# Patient Record
Sex: MALE | Race: BLACK OR AFRICAN AMERICAN | Employment: PART TIME | URBAN - METROPOLITAN AREA
[De-identification: names, ages, dates, MRNs, and addresses within clinical notes are randomized per-mention and may not be internally consistent; named-entity substitution may affect disease eponyms.]

---

## 2018-08-26 ENCOUNTER — HOSPITAL ENCOUNTER (INPATIENT)
Age: 18
LOS: 2 days | Discharge: HOME | DRG: 885 | End: 2018-08-29
Attending: EMERGENCY MEDICINE | Admitting: PSYCHIATRY & NEUROLOGY
Payer: COMMERCIAL

## 2018-08-26 DIAGNOSIS — R45.851 SUICIDAL IDEATIONS: Primary | ICD-10-CM

## 2018-08-26 PROCEDURE — 99284 EMERGENCY DEPT VISIT MOD MDM: CPT

## 2018-08-26 NOTE — IP AVS SNAPSHOT
[address] Patient: Heide Sagastume MRN: [id number] BSI:4/78/2622 A check carolyn indicates which time of day the medication should be taken. My Medications Notice You have not been prescribed any medications.

## 2018-08-26 NOTE — IP AVS SNAPSHOT
Summary of Care Report The Summary of Care report has been created to help improve care coordination. Users with access to Playblazer or [address] (Web-based application) may access additional patient information including the Discharge Summary. If you are not currently a [address] user and need more information, please call the number listed below in the [place] [address] section and ask to be connected with Medical Records. Facility Information Name Address Phone [address] [place] [address] 3630 [facility] [address] [phone] Patient Information Patient Name Sex  [address], [address] ([id number]) Male 2000 Discharge Information Admitting Provider Service Area Unit Tracey Cook MD  [place] [address] / [phone] Discharge Provider Discharge Date/Time Discharge Disposition Destination (none) 2018 Afternoon (Pending) AHR (none) Patient Language Language ENGLISH [13] Hospital Problems as of 2018  Never Reviewed Class Noted - Resolved Last Modified POA Active Problems Suicidal behavior  2018 - Present 2018 by Tracey Cook MD Unknown Entered by Tracey Cook MD  
  Depression  2018 - Present 2018 by Tracey Cook MD Unknown Entered by Tracey Cook MD  
  Suicidal thoughts  2018 - Present 2018 by Tracey Cook MD Unknown Entered by Tracey Cook MD  
  
You are allergic to the following No active allergies Current Discharge Medication List  
  
Notice You have not been prescribed any medications. Follow-up Information Follow up With Details Comments Contact Info  [employer]  Patient will follow up with outpatient care with [facility] with walk in intake hours: Monday through Thursday from 8:15am to 11:15am and 2:15pm to 4:15pm.  [facility] [address] [facility] [address] Phone: [phone] None   None (395) Patient stated that they have no PCP Discharge Instructions BEHAVIORAL HEALTH NURSING DISCHARGE NOTE Emergency Numbers [facility] Desk: [phone] [place] [facility]: [phone] Suicide Prevention Line: [phone] (TALK) The following personal items collected during your admission are returned to you:  
Dental Appliance: Dental Appliances: None Vision: Visual Aid: None Hearing Aid:   
Jewelry: Jewelry: Earrings, Other (comment) (sudarshan ear ring pt kept nose ring) Clothing: Clothing: Footwear, Jacket/Coat, Pants Other Valuables: Other Valuables: Cell Phone Valuables sent to safe: Personal Items Sent to Safe: none The discharge information has been reviewed with the patient. The patient verbalized understanding. Ph.Creative Activation Thank you for requesting access to Ph.Creative. Please follow the instructions below to securely access and download your online medical record. Ph.Creative allows you to send messages to your doctor, view your test results, renew your prescriptions, schedule appointments, and more. How Do I Sign Up? 1. In your internet browser, go to [email or web address] 
2. Click on the First Time User? Click Here link in the Sign In box. You will be redirect to the New Member Sign Up page. 3. Enter your Ph.Creative Access Code exactly as it appears below. You will not need to use this code after youve completed the sign-up process. If you do not sign up before the expiration date, you must request a new code. Ph.Creative Access Code: [email or web address] Expires: 2018 12:33 AM (This is the date your Ph.Creative access code will ) 4. Enter the last four digits of your Social Security Number (xxxx) and Date of Birth (mm/dd/yyyy) as indicated and click Submit. You will be taken to the next sign-up page. 5. Create a Ag[employer] Networks ID. This will be your [employer] login ID and cannot be changed, so think of one that is secure and easy to remember. 6. Create a [employer] password. You can change your password at any time. 7. Enter your Password Reset Question and Answer. This can be used at a later time if you forget your password. 8. Enter your e-mail address. You will receive e-mail notification when new information is available in [address] 9. Click Sign Up. You can now view and download portions of your medical record. 10. Click the Download Summary menu link to download a portable copy of your medical information. Additional Information If you have questions, please visit the Frequently Asked Questions section of the [employer] website at [email or web address] Remember, [employer] is NOT to be used for urgent needs. For medical emergencies, dial 911. Patient armband removed and shredded Chart Review Routing History No Routing History on File

## 2018-08-26 NOTE — IP AVS SNAPSHOT
303 [facility] 
 
 
 [address] Patient: Heide Sagastume MRN: [id number] QIS:7/29/2995 About your hospitalization You were admitted on:  August 27, 2018 You last received care in the:  [facility] SPECIAL TRT 1 You were discharged on:  August 29, 2018 Why you were hospitalized Your primary diagnosis was:  Not on File Your diagnoses also included:  Suicidal Behavior, Depression, Suicidal Thoughts Follow-up Information Follow up With Details Comments Contact Info [facility]  Patient will follow up with outpatient care with [facility] with walk in intake hours: Monday through Thursday from 8:15am to 11:15am and 2:15pm to 4:15pm.  [facility] [place] [address] Phone: [phone] None   None (395) Patient stated that they have no PCP Discharge Orders None A check carolyn indicates which time of day the medication should be taken. My Medications Notice You have not been prescribed any medications. Discharge Instructions BEHAVIORAL HEALTH NURSING DISCHARGE NOTE Emergency Numbers 7354 [facility] Desk: [phone] [place] [facility]: [phone] Suicide Prevention Line: [phone] (TALK) The following personal items collected during your admission are returned to you:  
Dental Appliance: Dental Appliances: None Vision: Visual Aid: None Hearing Aid:   
Jewelry: Jewelry: Earrings, Other (comment) (sudarshan ear ring pt kept nose ring) Clothing: Clothing: Footwear, Jacket/Coat, Pants Other Valuables: Other Valuables: Cell Phone Valuables sent to safe: Personal Items Sent to Safe: none The discharge information has been reviewed with the patient. The patient verbalized understanding. MyChart Activation Thank you for requesting access to Medsphere Systems. Please follow the instructions below to securely access and download your online medical record. Medsphere Systems allows you to send messages to your doctor, view your test results, renew your prescriptions, schedule appointments, and more. How Do I Sign Up? 1. In your internet browser, go to [email or web address] 
2. Click on the First Time User? Click Here link in the Sign In box. You will be redirect to the New Member Sign Up page. 3. Enter your Med[employer] Systems Access Code exactly as it appears below. You will not need to use this code after youve completed the sign-up process. If you do not sign up before the expiration date, you must request a new code. Medsphere Systems Access Code: [email or web address] Expires: 2018 12:33 AM (This is the date your [employer] Systems access code will ) 4. Enter the last four digits of your Social Security Number (xxxx) and Date of Birth (mm/dd/yyyy) as indicated and click Submit. You will be taken to the next sign-up page. 5. Create a Medsphere Systems ID. This will be your [employer]sphere Systems login ID and cannot be changed, so think of one that is secure and easy to remember. 6. Create a [employer] Systems password. You can change your password at any time. 7. Enter your Password Reset Question and Answer. This can be used at a later time if you forget your password. 8. Enter your e-mail address. You will receive e-mail notification when new information is available in [address] 9. Click Sign Up. You can now view and download portions of your medical record. 10. Click the Download Summary menu link to download a portable copy of your medical information. Additional Information If you have questions, please visit the Frequently Asked Questions section of the Medsphere Systems website at [email or web address] Remember, Medsphere Systems is NOT to be used for urgent needs. For medical emergencies, dial 911. Patient armband removed and shredded Introducing [facility] & HEALTH SERVICES! [employer] introduces PrecisionHawkt patient portal. Now you can access parts of your medical record, email your doctor's office, and request medication refills online. 1. In your internet browser, go to [email or web address] 2. Click on the First Time User? Click Here link in the Sign In box. You will see the New Member Sign Up page. 3. Enter your [employer]mex [employer] International Access Code exactly as it appears below. You will not need to use this code after youve completed the sign-up process. If you do not sign up before the expiration date, you must request a new code. · [employer] Access Code: [email or web address] Expires: 11/25/2018 12:33 AM 
 
4. Enter the last four digits of your Social Security Number (xxxx) and Date of Birth (mm/dd/yyyy) as indicated and click Submit. You will be taken to the next sign-up page. 5. Create a [employer] International ID. This will be your [employer] login ID and cannot be changed, so think of one that is secure and easy to remember. 6. Create a [employer] password. You can change your password at any time. 7. Enter your Password Reset Question and Answer. This can be used at a later time if you forget your password. 8. Enter your e-mail address. You will receive e-mail notification when new information is available in [address] 9. Click Sign Up. You can now view and download portions of your medical record. 10. Click the Download Summary menu link to download a portable copy of your medical information. If you have questions, please visit the Frequently Asked Questions section of the [employer] website. Remember, [employer] is NOT to be used for urgent needs. For medical emergencies, dial 911. Now available from your iPhone and Android! Introducing Gordy Palencia As a [employer] patient, I wanted to make you aware of our electronic visit tool called Gordy Palencia. [employer] 24/7 allows you to connect within minutes with a medical provider 24 hours a day, seven days a week via a mobile device or tablet or logging into a secure website from your computer. You can access Supersonic from anywhere in the [place]. A virtual visit might be right for you when you have a simple condition and feel like you just dont want to get out of bed, or cant get away from work for an appointment, when your regular [address] [place] [address] provider is not available (evenings, weekends or holidays), or when youre out of town and need minor care. Electronic visits cost only $49 and if the [address] [place] [address] 24/7 provider determines a prescription is needed to treat your condition, one can be electronically transmitted to a nearby pharmacy*. Please take a moment to enroll today if you have not already done so. The enrollment process is free and takes just a few minutes. To enroll, please download the Science Behind Sweat [facility] 24/7 ad to your tablet or phone, or visit [email or web address] to enroll on your computer. And, as an [address] [place] [address] patient with a NantWorks account, the results of your visits will be scanned into your electronic medical record and your primary care provider will be able to view the scanned results. We urge you to continue to see your regular [address] [place] [address] provider for your ongoing medical care. And while your primary care provider may not be the one available when you seek a Gordy Crawfordmoshefin virtual visit, the peace of mind you get from getting a real diagnosis real time can be priceless. For more information on [employer] Mediamoshefin, view our Frequently Asked Questions (FAQs) at [email or web address]. Sincerely, 
 
Shima Vogt MD 
Chief Medical Officer Karen Longo *:  certain medications cannot be prescribed via Advision Mediarody Providers Seen During Your Hospitalization Provider Specialty Primary office phone Shanda Cunningham MD Emergency Medicine [phone] Vikram Desai MD Psychiatry [phone] Your Primary Care Physician (PCP) Primary Care Physician Office Phone Office Fax NONE ** None ** ** None ** You are allergic to the following No active allergies Recent Documentation Height Weight BMI Smoking Status 1.854 m (89 %, Z= 1.21)* 61.2 kg (17 %, Z= -0.94)* 17.81 kg/m2 (<1 %, Z= -2.42)* Never Smoker *Growth percentiles are based on CDC 2-20 Years data. Emergency Contacts Name Discharge Info Relation Home Work Mobile Cindy Schwartz DISCHARGE CAREGIVER [3] Girlfriend [18] [phone] Patient Belongings The following personal items are in your possession at time of discharge: 
  Dental Appliances: None  Visual Aid: None      Home Medications: None   Jewelry: Earrings, Other (comment) (sudarshan ear ring pt kept nose ring)  Clothing: Footwear, Jacket/Coat, Pants    Other Valuables: Cell Phone  Personal Items Sent to Safe: none Please provide this summary of care documentation to your next provider. Signatures-by signing, you are acknowledging that this After Visit Summary has been reviewed with you and you have received a copy. Patient Signature:  ____________________________________________________________ Date:  ____________________________________________________________  
  
[facility] Provider Signature:  ____________________________________________________________ Date:  ____________________________________________________________

## 2018-08-27 PROBLEM — F32.A DEPRESSION: Status: ACTIVE | Noted: 2018-08-27

## 2018-08-27 PROBLEM — R45.89 SUICIDAL BEHAVIOR: Status: ACTIVE | Noted: 2018-08-27

## 2018-08-27 PROBLEM — R45.851 SUICIDAL THOUGHTS: Status: ACTIVE | Noted: 2018-08-27

## 2018-08-27 LAB
ALBUMIN SERPL-MCNC: 4.3 G/DL (ref 3.4–5)
ALBUMIN/GLOB SERPL: 1.1 {RATIO} (ref 0.8–1.7)
ALP SERPL-CCNC: 73 U/L (ref 45–117)
ALT SERPL-CCNC: 35 U/L (ref 16–61)
AMPHET UR QL SCN: NEGATIVE
ANION GAP SERPL CALC-SCNC: 8 MMOL/L (ref 3–18)
APPEARANCE UR: CLEAR
AST SERPL-CCNC: 26 U/L (ref 15–37)
BACTERIA URNS QL MICRO: NEGATIVE /HPF
BARBITURATES UR QL SCN: NEGATIVE
BASOPHILS # BLD: 0 K/UL (ref 0–0.1)
BASOPHILS NFR BLD: 0 % (ref 0–2)
BENZODIAZ UR QL: NEGATIVE
BILIRUB SERPL-MCNC: 0.7 MG/DL (ref 0.2–1)
BILIRUB UR QL: ABNORMAL
BUN SERPL-MCNC: 17 MG/DL (ref 7–18)
BUN/CREAT SERPL: 22 (ref 12–20)
CALCIUM SERPL-MCNC: 9.3 MG/DL (ref 8.5–10.1)
CANNABINOIDS UR QL SCN: POSITIVE
CHLORIDE SERPL-SCNC: 105 MMOL/L (ref 100–108)
CO2 SERPL-SCNC: 26 MMOL/L (ref 21–32)
COCAINE UR QL SCN: NEGATIVE
COLOR UR: ABNORMAL
CREAT SERPL-MCNC: 0.76 MG/DL (ref 0.6–1.3)
DIFFERENTIAL METHOD BLD: ABNORMAL
EOSINOPHIL # BLD: 0.2 K/UL (ref 0–0.4)
EOSINOPHIL NFR BLD: 3 % (ref 0–5)
EPITH CASTS URNS QL MICRO: ABNORMAL /LPF (ref 0–5)
ERYTHROCYTE [DISTWIDTH] IN BLOOD BY AUTOMATED COUNT: 12.5 % (ref 11.6–14.5)
ETHANOL SERPL-MCNC: <3 MG/DL (ref 0–3)
GLOBULIN SER CALC-MCNC: 3.9 G/DL (ref 2–4)
GLUCOSE SERPL-MCNC: 98 MG/DL (ref 74–99)
GLUCOSE UR STRIP.AUTO-MCNC: NEGATIVE MG/DL
HCT VFR BLD AUTO: 36.8 % (ref 36–48)
HDSCOM,HDSCOM: ABNORMAL
HGB BLD-MCNC: 12.7 G/DL (ref 13–16)
HGB UR QL STRIP: NEGATIVE
KETONES UR QL STRIP.AUTO: 40 MG/DL
LEUKOCYTE ESTERASE UR QL STRIP.AUTO: ABNORMAL
LYMPHOCYTES # BLD: 1.8 K/UL (ref 0.9–3.6)
LYMPHOCYTES NFR BLD: 34 % (ref 21–52)
MCH RBC QN AUTO: 28.3 PG (ref 24–34)
MCHC RBC AUTO-ENTMCNC: 34.5 G/DL (ref 31–37)
MCV RBC AUTO: 82 FL (ref 74–97)
METHADONE UR QL: NEGATIVE
MONOCYTES # BLD: 0.5 K/UL (ref 0.05–1.2)
MONOCYTES NFR BLD: 9 % (ref 3–10)
MUCOUS THREADS URNS QL MICRO: ABNORMAL /LPF
NEUTS SEG # BLD: 3 K/UL (ref 1.8–8)
NEUTS SEG NFR BLD: 54 % (ref 40–73)
NITRITE UR QL STRIP.AUTO: NEGATIVE
OPIATES UR QL: NEGATIVE
OTHER,OTHU: ABNORMAL
PCP UR QL: NEGATIVE
PH UR STRIP: 6 [PH] (ref 5–8)
PLATELET # BLD AUTO: 214 K/UL (ref 135–420)
PMV BLD AUTO: 9.5 FL (ref 9.2–11.8)
POTASSIUM SERPL-SCNC: 3.3 MMOL/L (ref 3.5–5.5)
PROT SERPL-MCNC: 8.2 G/DL (ref 6.4–8.2)
PROT UR STRIP-MCNC: 100 MG/DL
RBC # BLD AUTO: 4.49 M/UL (ref 4.7–5.5)
RBC #/AREA URNS HPF: ABNORMAL /HPF (ref 0–5)
SODIUM SERPL-SCNC: 139 MMOL/L (ref 136–145)
SP GR UR REFRACTOMETRY: >1.03 (ref 1–1.03)
TSH SERPL DL<=0.05 MIU/L-ACNC: 0.87 UIU/ML (ref 0.36–3.74)
UROBILINOGEN UR QL STRIP.AUTO: 1 EU/DL (ref 0.2–1)
WBC # BLD AUTO: 5.5 K/UL (ref 4.6–13.2)
WBC URNS QL MICRO: ABNORMAL /HPF (ref 0–4)

## 2018-08-27 PROCEDURE — 84443 ASSAY THYROID STIM HORMONE: CPT | Performed by: PSYCHIATRY & NEUROLOGY

## 2018-08-27 PROCEDURE — 85025 COMPLETE CBC W/AUTO DIFF WBC: CPT | Performed by: NURSE PRACTITIONER

## 2018-08-27 PROCEDURE — 81001 URINALYSIS AUTO W/SCOPE: CPT | Performed by: NURSE PRACTITIONER

## 2018-08-27 PROCEDURE — 36415 COLL VENOUS BLD VENIPUNCTURE: CPT | Performed by: PSYCHIATRY & NEUROLOGY

## 2018-08-27 PROCEDURE — 80307 DRUG TEST PRSMV CHEM ANLYZR: CPT | Performed by: NURSE PRACTITIONER

## 2018-08-27 PROCEDURE — 65220000005 HC RM SEMIPRIVATE PSYCH 3 OR 4 BED

## 2018-08-27 PROCEDURE — 80053 COMPREHEN METABOLIC PANEL: CPT | Performed by: NURSE PRACTITIONER

## 2018-08-27 RX ORDER — LORAZEPAM 2 MG/ML
1 INJECTION INTRAMUSCULAR
Status: DISCONTINUED | OUTPATIENT
Start: 2018-08-27 | End: 2018-08-29 | Stop reason: HOSPADM

## 2018-08-27 RX ORDER — IBUPROFEN 400 MG/1
400 TABLET ORAL
Status: DISCONTINUED | OUTPATIENT
Start: 2018-08-27 | End: 2018-08-29 | Stop reason: HOSPADM

## 2018-08-27 RX ORDER — HYDROXYZINE PAMOATE 50 MG/1
50 CAPSULE ORAL
Status: DISCONTINUED | OUTPATIENT
Start: 2018-08-27 | End: 2018-08-29 | Stop reason: HOSPADM

## 2018-08-27 RX ORDER — LORAZEPAM 1 MG/1
2 TABLET ORAL
Status: DISCONTINUED | OUTPATIENT
Start: 2018-08-27 | End: 2018-08-29 | Stop reason: HOSPADM

## 2018-08-27 RX ORDER — BENZTROPINE MESYLATE 1 MG/1
1 TABLET ORAL
Status: DISCONTINUED | OUTPATIENT
Start: 2018-08-27 | End: 2018-08-29 | Stop reason: HOSPADM

## 2018-08-27 RX ORDER — HALOPERIDOL 5 MG/1
5 TABLET ORAL
Status: DISCONTINUED | OUTPATIENT
Start: 2018-08-27 | End: 2018-08-29 | Stop reason: HOSPADM

## 2018-08-27 RX ORDER — BENZTROPINE MESYLATE 1 MG/ML
1 INJECTION INTRAMUSCULAR; INTRAVENOUS
Status: DISCONTINUED | OUTPATIENT
Start: 2018-08-27 | End: 2018-08-29 | Stop reason: HOSPADM

## 2018-08-27 RX ORDER — BENZTROPINE MESYLATE 1 MG/ML
2 INJECTION INTRAMUSCULAR; INTRAVENOUS
Status: DISCONTINUED | OUTPATIENT
Start: 2018-08-27 | End: 2018-08-29 | Stop reason: HOSPADM

## 2018-08-27 RX ORDER — LORAZEPAM 1 MG/1
1 TABLET ORAL
Status: DISCONTINUED | OUTPATIENT
Start: 2018-08-27 | End: 2018-08-29 | Stop reason: HOSPADM

## 2018-08-27 RX ORDER — TRAZODONE HYDROCHLORIDE 50 MG/1
50 TABLET ORAL
Status: DISCONTINUED | OUTPATIENT
Start: 2018-08-27 | End: 2018-08-29 | Stop reason: HOSPADM

## 2018-08-27 RX ORDER — LORAZEPAM 2 MG/ML
2 INJECTION INTRAMUSCULAR
Status: DISCONTINUED | OUTPATIENT
Start: 2018-08-27 | End: 2018-08-29 | Stop reason: HOSPADM

## 2018-08-27 RX ORDER — BENZTROPINE MESYLATE 1 MG/1
2 TABLET ORAL
Status: DISCONTINUED | OUTPATIENT
Start: 2018-08-27 | End: 2018-08-29 | Stop reason: HOSPADM

## 2018-08-27 RX ORDER — HALOPERIDOL 5 MG/ML
5 INJECTION INTRAMUSCULAR
Status: DISCONTINUED | OUTPATIENT
Start: 2018-08-27 | End: 2018-08-29 | Stop reason: HOSPADM

## 2018-08-27 NOTE — ED PROVIDER NOTES
HPI Comments: Pt presents to ed with police in cuffs after being on a bridge and threatening to jump. Pt states suicidal thoughts but does not know now if he still has them, no injuries reported. Pt does not have a hx of mental health problems    Patient is a 21 y.o. male presenting with mental health disorder. The history is provided by the patient and the police. No  was used. Mental Health Problem   The primary symptoms include bizarre behavior. The current episode started today. The bizarre behavior started this week. He has abnormal agitated behavior. The degree of incapacity that he is experiencing as a consequence of his illness is moderate. He admits to suicidal ideas. He does have a plan to commit suicide. He contemplates harming himself. He has not already injured self. He does not contemplate injuring another person. He has not already  injured another person. No past medical history on file. No past surgical history on file. No family history on file. Social History     Social History    Marital status: SINGLE     Spouse name: N/A    Number of children: N/A    Years of education: N/A     Occupational History    Not on file. Social History Main Topics    Smoking status: Not on file    Smokeless tobacco: Not on file    Alcohol use Not on file    Drug use: Not on file    Sexual activity: Not on file     Other Topics Concern    Not on file     Social History Narrative         ALLERGIES: Review of patient's allergies indicates no known allergies. Review of Systems   Constitutional: Negative for fever. Respiratory: Negative for chest tightness. Neurological: Negative for dizziness and weakness. Psychiatric/Behavioral: Positive for suicidal ideas. Negative for self-injury. All other systems reviewed and are negative.       Vitals:    08/27/18 0039   BP: 127/74   Pulse: 79   Resp: 16   Temp: 97.8 °F (36.6 °C)   SpO2: 100%            Physical Exam   Constitutional: He is oriented to person, place, and time. He appears well-developed and well-nourished. HENT:   Head: Normocephalic and atraumatic. Eyes: Conjunctivae and EOM are normal. Pupils are equal, round, and reactive to light. Neck: Normal range of motion. Neck supple. Cardiovascular: Normal rate and regular rhythm. Pulmonary/Chest: Effort normal and breath sounds normal.   Abdominal: Soft. Bowel sounds are normal.   Musculoskeletal: Normal range of motion. Neurological: He is alert and oriented to person, place, and time. He has normal reflexes. Skin: Skin is warm and dry. Psychiatric: His speech is normal. Judgment normal. He is withdrawn. He exhibits a depressed mood. He expresses suicidal ideation. He expresses no homicidal ideation. He expresses suicidal plans. Nursing note and vitals reviewed.        MDM  Number of Diagnoses or Management Options  Suicidal ideations:   Diagnosis management comments: CSB in room with pt for evaluation    0100 Ramses from [facility] informed me pt would be on TDO    0200  Per Jonathan Gonzalez and Vandana Yan with  pt to be admitted to out inpatient mental health unit       Amount and/or Complexity of Data Reviewed  Clinical lab tests: ordered and reviewed  Review and summarize past medical records: yes  Independent visualization of images, tracings, or specimens: yes    Risk of Complications, Morbidity, and/or Mortality  Presenting problems: moderate  Diagnostic procedures: moderate  Management options: moderate          ED Course       Procedures            Vitals:  Patient Vitals for the past 12 hrs:   Temp Pulse Resp BP SpO2   08/27/18 0039 97.8 °F (36.6 °C) 79 16 127/74 100 %       Medications ordered:   Medications - No data to display      Lab findings:  Recent Results (from the past 12 hour(s))   CBC WITH AUTOMATED DIFF    Collection Time: 08/27/18  1:20 AM   Result Value Ref Range    WBC 5.5 4.6 - 13.2 K/uL    RBC 4.49 (L) 4.70 - 5.50 M/uL    HGB 12.7 (L) 13.0 - 16.0 g/dL    HCT 36.8 36.0 - 48.0 %    MCV 82.0 74.0 - 97.0 FL    MCH 28.3 24.0 - 34.0 PG    MCHC 34.5 31.0 - 37.0 g/dL    RDW 12.5 11.6 - 14.5 %    PLATELET 394 558 - 848 K/uL    MPV 9.5 9.2 - 11.8 FL    NEUTROPHILS 54 40 - 73 %    LYMPHOCYTES 34 21 - 52 %    MONOCYTES 9 3 - 10 %    EOSINOPHILS 3 0 - 5 %    BASOPHILS 0 0 - 2 %    ABS. NEUTROPHILS 3.0 1.8 - 8.0 K/UL    ABS. LYMPHOCYTES 1.8 0.9 - 3.6 K/UL    ABS. MONOCYTES 0.5 0.05 - 1.2 K/UL    ABS. EOSINOPHILS 0.2 0.0 - 0.4 K/UL    ABS. BASOPHILS 0.0 0.0 - 0.1 K/UL    DF AUTOMATED     METABOLIC PANEL, COMPREHENSIVE    Collection Time: 08/27/18  1:20 AM   Result Value Ref Range    Sodium 139 136 - 145 mmol/L    Potassium 3.3 (L) 3.5 - 5.5 mmol/L    Chloride 105 100 - 108 mmol/L    CO2 26 21 - 32 mmol/L    Anion gap 8 3.0 - 18 mmol/L    Glucose 98 74 - 99 mg/dL    BUN 17 7.0 - 18 MG/DL    Creatinine 0.76 0.6 - 1.3 MG/DL    BUN/Creatinine ratio 22 (H) 12 - 20      GFR est AA >60 >60 ml/min/1.73m2    GFR est non-AA >60 >60 ml/min/1.73m2    Calcium 9.3 8.5 - 10.1 MG/DL    Bilirubin, total 0.7 0.2 - 1.0 MG/DL    ALT (SGPT) 35 16 - 61 U/L    AST (SGOT) 26 15 - 37 U/L    Alk.  phosphatase 73 45 - 117 U/L    Protein, total 8.2 6.4 - 8.2 g/dL    Albumin 4.3 3.4 - 5.0 g/dL    Globulin 3.9 2.0 - 4.0 g/dL    A-G Ratio 1.1 0.8 - 1.7     ETHYL ALCOHOL    Collection Time: 08/27/18  1:20 AM   Result Value Ref Range    ALCOHOL(ETHYL),SERUM <3 0 - 3 MG/DL   URINALYSIS W/ RFLX MICROSCOPIC    Collection Time: 08/27/18  1:25 AM   Result Value Ref Range    Color DARK YELLOW      Appearance CLEAR      Specific gravity >1.030 (H) 1.005 - 1.030    pH (UA) 6.0 5.0 - 8.0      Protein 100 (A) NEG mg/dL    Glucose NEGATIVE  NEG mg/dL    Ketone 40 (A) NEG mg/dL    Bilirubin SMALL (A) NEG      Blood NEGATIVE  NEG      Urobilinogen 1.0 0.2 - 1.0 EU/dL    Nitrites NEGATIVE  NEG      Leukocyte Esterase TRACE (A) NEG     DRUG SCREEN, URINE    Collection Time: 08/27/18  1:25 AM   Result Value Ref Range    BENZODIAZEPINES NEGATIVE  NEG      BARBITURATES NEGATIVE  NEG      THC (TH-CANNABINOL) POSITIVE (A) NEG      OPIATES NEGATIVE  NEG      PCP(PHENCYCLIDINE) NEGATIVE  NEG      COCAINE NEGATIVE  NEG      AMPHETAMINES NEGATIVE  NEG      METHADONE NEGATIVE  NEG      HDSCOM (NOTE)    URINE MICROSCOPIC ONLY    Collection Time: 08/27/18  1:25 AM   Result Value Ref Range    WBC 4 to 10 0 - 4 /hpf    RBC NONE 0 - 5 /hpf    Epithelial cells FEW 0 - 5 /lpf    Bacteria NEGATIVE  NEG /hpf    Mucus 4+ (A) NEG /lpf    Other: RENAL TUBULAR EPITHELIAL CELLS               Disposition:    Diagnosis:   1.  Suicidal ideations        Disposition:  Admitted to inpatient mental health unit         Savanna Katz ENP-C,FNP-C

## 2018-08-27 NOTE — ED NOTES
Report called to Aleisha Collins. Pt to be transferred via wheelchair with officer and security and ED staff. Belongings with pt. Pt in blue scrubs and red hospital socks.

## 2018-08-27 NOTE — BSMART NOTE
Crisis Note: Per Chaitanya Chua CSB, patient is a 21 year male who arrived on an ECO to the ED. Patient stated that he had a fight and broke up with the girlfriend. Patient later returned to his girlfriend's house and girlfriend said that patient can not live with her. Patient reportedly said, if I can not live with my girlfriend, then I will kill myself. Girlfriend called the police and patient was found on a bridge. Patient also said that his mom is , his father has chronic mental illness, and he has not seen his father in 13 years. Patient will be TDO status. Discussed with Dr. Guerline Oropeza and admission orders received; Yamilex Schultz made aware that patient will be TDO'd and assigned bed on [facility] I; report given to Nay Bardales, unit nurse.

## 2018-08-27 NOTE — BSMART NOTE
SW Contact:  Pt comments about admission similar with what in hospital record. He's not upset girlfriend called police but \"never knew such a thing like this could happen\". He clarified threats to harm self were to get place to stay / her take him back in, sinc been on street over a week. He admits he was \"not responsible\" and may have taken advantage of her kindness. Still periods of depression & anxious some. Reports his mom  when infant, (dad never part of his life & is homeless) & was raised by family until middle school when custody transferred to very competent family who had already x2 sons. Pt admits it was positive experience which motivated him to do good in school. 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 Pt has friend to stay with after d/c who'll pick him up also. Also explained TDO process & importance of outpt tx.

## 2018-08-27 NOTE — PROGRESS NOTES
NUTRITION    Nutrition Screen    RECOMMENDATIONS / PLAN:     - Add supplements: Ensure Enlive, TID.  - Continue RD inpatient monitoring and evaluation. NUTRITION DIAGNOSIS & INTERVENTIONS:     [x] Meals/snacks: modify composition  [x] Medical food supplement therapy: initiate  [x] Collaboration and referral of nutrition care: discussed pt with nursing    Nutrition Diagnosis:  Inadequate oral intake related to decreased appetite as evidenced by pt with poor po intake since admission. ASSESSMENT:     Pt unavailable during visit, pt showering per nursing. Pt did not eat much for breakfast this am and has not consumed any of his lunch today during visit. Lunch tray available and nursing to encourage pt to eat. Hypokalemia noted today, K at 3.3 mmol/L. Average intake adequate to meet patients estimated nutritional needs:   [] Yes     [x] No      [] Unable to determine at this time    Diet: DIET REGULAR    Food Allergies: NKFA  Current Appetite:   [] Good     [] Fair     [x] Poor     [] Other  Appetite/meal intake prior to admission:   [] Good     [] Fair     [] Poor     [x] Other: unknown  Feeding Limitations:  [] Swallowing Difficulty       [] Chewing Difficulty       [] Other   Current Meal Intake: No data found. Gastrointestinal Issues:  [] Yes    [x] No   Skin Integrity:  WDL    Pertinent Medications:  Reviewed   Labs:  Reviewed     Anthropometrics:  Ht Readings from Last 1 Encounters:   08/27/18 6' 1\" (1.854 m) (89 %, Z= 1.21)*     * Growth percentiles are based on CDC 2-20 Years data. Last 3 Recorded Weights in this Encounter    08/27/18 0407   Weight: 61.2 kg (135 lb)       Body mass index is 17.81 kg/(m^2). Underweight    Weight History: Pt unavailable during initial visit. Unable to assess weight hx at this time. Weight Metrics 8/27/2018   Weight 135 lb   BMI 17.81 kg/m2       Admitting Diagnosis: Suicidal thoughts  Depression  Suicidal behavior  History reviewed.  No pertinent past medical history. Education Needs:        [x] None identified  [] Identified - Not appropriate at this time  []  Identified and addressed - refer to education log  Learning Limitations:   [x] None identified  [] Identified    Cultural, Methodist & ethnic food preferences identified:  [x] None    [] Yes      ESTIMATED NUTRITION NEEDS:     4127-8508 kcal (MSJx1.2-1.3), 67-84 gm protein (0.8-1 gm/kg), 1 mL/kcal  Based on: 84 kg       [] Actual BW      [x] IBW          MONITORING & EVALUATION:     Nutrition Goal(s):   1. Po intake of meals will meet >75% of patient estimated nutritional needs within the next 7 days.   Outcome:   [] Met    []  Not Met   [x] New/Initial Goal    Monitor:  [x] Food and beverage intake   [x] Diet order   [x] Nutrition-focused physical findings   [] Weight      Previous Recommendations (for follow-up assessments only):     []   Implemented       []   Not Implemented (RD to address)   [] No Longer Appropriate   [] No Recommendation Made       Discharge Planning: regular diet + Ensure Enlive as needed   [x]  Participated in care planning, discharge planning, & interdisciplinary rounds as appropriate      Nola Nichols RD   Pager: [phone]

## 2018-08-27 NOTE — BSMART NOTE
ACTIVITIES THERAPY PROGRESS NOTE    Group time:1230    The patient declined group, initially unavailable.

## 2018-08-27 NOTE — BH NOTES
JINNY Notes: pt has been compliant on shift. Pt has been in his room and out to interact 50% of the day. Pt mood appears to have brighten since early part of shift. Pt is not eating 100% of meals. Pt is not a management problem and will continue to be monitored for safety precautions and locations.

## 2018-08-27 NOTE — BH NOTES
Patient admitted to [facility] [place] [address] unit; report given to Monica Saba RN from [address] [place] [address]. Patient states he had no intention to kill himself, and denies suicidal/homicidal ideation on admission to unit. Patient polite and appropriate during admission assessment. Ate a snack and then went on to bed. Patient states he knows he is on TDO status, and will talk to the psychiatrist about this. Patient has a small nose ring that he states does not come out.

## 2018-08-27 NOTE — H&P
[address] Services    Admission Note      Patient:  Halie Bell Age:  21 y.o. :  2000     SEX:  male MRN:  [id number] CSN:  [id number]    2018  5:38 PM    Informants and Patient Contacts: Patient    Voluntary: yes    Identifying Data and Chief Compliant: \" I told my GF that I wanted to kill myself\"     History of Present Illness:  21 year  Old AA male who reports previous h/o depression. He was brought to  ED by police. Patient stated that he had a fight and broke up with the girlfriend. Patient later returned to his girlfriend's house and girlfriend said that patient can not live with her. Patient reportedly said, if I can not live with my girlfriend, then I will kill myself. Girlfriend called the police and patient was found on a bridge. He states that  Used suicidal  Thoughts as a  'bluff' to get back with his girl friend. He states his GF needs  his own space and he cannot live with her. He states he was raised by his grandmother, mother passed away. States his father is homeless as well. He finished [employer]. He works at a [employer]. Reportedly his father also mental armand issues and he  has not seen his father in 13 years. He gave permission to talk to his sister . He refuses to take medications.       Past Psychiatric/Medical History: History reviewed. No pertinent past medical history. He denies previous h/o psych hospitalizations or self harm. Substance Use History: Marijuana: Allergies: No Known Allergies    Prior to admission medications: No prescriptions prior to admission.        Current medications:   Current Facility-Administered Medications   Medication Dose Route Frequency    haloperidol (HALDOL) tablet 5 mg  5 mg Oral Q6H PRN    haloperidol lactate (HALDOL) injection 5 mg  5 mg IntraMUSCular Q6H PRN    hydrOXYzine pamoate (VISTARIL) capsule 50 mg  50 mg Oral Q4H PRN    ibuprofen (MOTRIN) tablet 400 mg  400 mg Oral Q6H PRN    LORazepam (ATIVAN) injection 1 mg  1 mg IntraMUSCular Q6H PRN    LORazepam (ATIVAN) tablet 1 mg  1 mg Oral Q6H PRN    traZODone (DESYREL) tablet 50 mg  50 mg Oral QHS PRN    LORazepam (ATIVAN) injection 2 mg  2 mg IntraMUSCular Q6H PRN    LORazepam (ATIVAN) tablet 2 mg  2 mg Oral Q6H PRN    benztropine (COGENTIN) tablet 2 mg  2 mg Oral Q4H PRN    benztropine (COGENTIN) injection 2 mg  2 mg IntraMUSCular Q4H PRN    benztropine (COGENTIN) injection 1 mg  1 mg IntraMUSCular Q4H PRN    benztropine (COGENTIN) tablet 1 mg  1 mg Oral Q4H PRN       Outpatient Physicians:    None  Review of Systems  positive for behavior problems    Family History of psychiatric and medical illness and substance abuse  History reviewed. No pertinent family history. Personal History    States he was raised by his grandmother, mother passed away. States his father is homeless as well. He finished [employer]. He works at [employer].        Mental Status Exam      Appearance    General Behavior   Pleasant and cooperative     Speech form and content,  Language  Associations  Form of Thought   Normal flow and volume  TP : Logical, goal oriented   Mood, Affect  Self-Attitude  Vital Sense  SI/HI/PDW   Deprssed  No SI, HI, hopelessness   Abnormal Perceptions and illusions   Denies     Delusions   None   Anxiety    Denies   COGNITION Intelligence Abstraction   Intact   Judgement Insight    Limited       Data/Labs/Vital Signs    Patient Vitals for the past 24 hrs:   BP Temp Pulse Resp SpO2 Height Weight   08/27/18 0753 124/75 97 °F (36.1 °C) 64 16 - - -   08/27/18 0407 115/78 97.7 °F (36.5 °C) 60 16 - 6' 1\" (1.854 m) 61.2 kg (135 lb)   08/27/18 0336 111/68 97.7 °F (36.5 °C) (!) 48 12 99 % - -   08/27/18 0039 127/74 97.8 °F (36.6 °C) 79 16 100 % - -       Recent Results (from the past 24 hour(s))   CBC WITH AUTOMATED DIFF    Collection Time: 08/27/18  1:20 AM   Result Value Ref Range WBC 5.5 4.6 - 13.2 K/uL    RBC 4.49 (L) 4.70 - 5.50 M/uL    HGB 12.7 (L) 13.0 - 16.0 g/dL    HCT 36.8 36.0 - 48.0 %    MCV 82.0 74.0 - 97.0 FL    MCH 28.3 24.0 - 34.0 PG    MCHC 34.5 31.0 - 37.0 g/dL    RDW 12.5 11.6 - 14.5 %    PLATELET 850 586 - 475 K/uL    MPV 9.5 9.2 - 11.8 FL    NEUTROPHILS 54 40 - 73 %    LYMPHOCYTES 34 21 - 52 %    MONOCYTES 9 3 - 10 %    EOSINOPHILS 3 0 - 5 %    BASOPHILS 0 0 - 2 %    ABS. NEUTROPHILS 3.0 1.8 - 8.0 K/UL    ABS. LYMPHOCYTES 1.8 0.9 - 3.6 K/UL    ABS. MONOCYTES 0.5 0.05 - 1.2 K/UL    ABS. EOSINOPHILS 0.2 0.0 - 0.4 K/UL    ABS. BASOPHILS 0.0 0.0 - 0.1 K/UL    DF AUTOMATED     METABOLIC PANEL, COMPREHENSIVE    Collection Time: 08/27/18  1:20 AM   Result Value Ref Range    Sodium 139 136 - 145 mmol/L    Potassium 3.3 (L) 3.5 - 5.5 mmol/L    Chloride 105 100 - 108 mmol/L    CO2 26 21 - 32 mmol/L    Anion gap 8 3.0 - 18 mmol/L    Glucose 98 74 - 99 mg/dL    BUN 17 7.0 - 18 MG/DL    Creatinine 0.76 0.6 - 1.3 MG/DL    BUN/Creatinine ratio 22 (H) 12 - 20      GFR est AA >60 >60 ml/min/1.73m2    GFR est non-AA >60 >60 ml/min/1.73m2    Calcium 9.3 8.5 - 10.1 MG/DL    Bilirubin, total 0.7 0.2 - 1.0 MG/DL    ALT (SGPT) 35 16 - 61 U/L    AST (SGOT) 26 15 - 37 U/L    Alk.  phosphatase 73 45 - 117 U/L    Protein, total 8.2 6.4 - 8.2 g/dL    Albumin 4.3 3.4 - 5.0 g/dL    Globulin 3.9 2.0 - 4.0 g/dL    A-G Ratio 1.1 0.8 - 1.7     ETHYL ALCOHOL    Collection Time: 08/27/18  1:20 AM   Result Value Ref Range    ALCOHOL(ETHYL),SERUM <3 0 - 3 MG/DL   URINALYSIS W/ RFLX MICROSCOPIC    Collection Time: 08/27/18  1:25 AM   Result Value Ref Range    Color DARK YELLOW      Appearance CLEAR      Specific gravity >1.030 (H) 1.005 - 1.030    pH (UA) 6.0 5.0 - 8.0      Protein 100 (A) NEG mg/dL    Glucose NEGATIVE  NEG mg/dL    Ketone 40 (A) NEG mg/dL    Bilirubin SMALL (A) NEG      Blood NEGATIVE  NEG      Urobilinogen 1.0 0.2 - 1.0 EU/dL    Nitrites NEGATIVE  NEG      Leukocyte Esterase TRACE (A) NEG DRUG SCREEN, URINE    Collection Time: 08/27/18  1:25 AM   Result Value Ref Range    BENZODIAZEPINES NEGATIVE  NEG      BARBITURATES NEGATIVE  NEG      THC (TH-CANNABINOL) POSITIVE (A) NEG      OPIATES NEGATIVE  NEG      PCP(PHENCYCLIDINE) NEGATIVE  NEG      COCAINE NEGATIVE  NEG      AMPHETAMINES NEGATIVE  NEG      METHADONE NEGATIVE  NEG      HDSCOM (NOTE)    URINE MICROSCOPIC ONLY    Collection Time: 08/27/18  1:25 AM   Result Value Ref Range    WBC 4 to 10 0 - 4 /hpf    RBC NONE 0 - 5 /hpf    Epithelial cells FEW 0 - 5 /lpf    Bacteria NEGATIVE  NEG /hpf    Mucus 4+ (A) NEG /lpf    Other: RENAL TUBULAR EPITHELIAL CELLS     TSH 3RD GENERATION    Collection Time: 08/27/18  1:15 PM   Result Value Ref Range    TSH 0.87 0.36 - 3.74 uIU/mL       Axis I: Major Depression, Rec  Axis II: No diagnosis  Axis III: History reviewed. No pertinent past medical history. Axis IV: Problems with primary support group  Axis V: 21-30 behavior considerably influenced by delusions or hallucinations OR serious impairment in judgment, communication OR inability to function in almost all areas      Recommendations/Plan  -Admit  -Psychosocial assessment and substance abuse counseling  -Somatic evaluation and tx Cos  -Begin disposition plannining  ·            I certify that this patient's inpatient psychiatric hospital services furnished since the previous certification were, and continue to be, required for treatment that could reasonably be expected to improve the patient's condition, or for diagnostic study, and that the patient continues to need, on a daily basis, active treatment furnished directly by or requiring the supervision of inpatient psychiatric facility personnel. In addition the hospital records show that services furnished were intensive treatment services, admission or related services, or equivalent services.     Nestor Wagner MD 8/27/2018 5:38 PM

## 2018-08-28 PROCEDURE — 65220000005 HC RM SEMIPRIVATE PSYCH 3 OR 4 BED

## 2018-08-28 NOTE — BSMART NOTE
SW Contact:  Reviewed basic d/c plan if released from court (see fyi). .. Shared some insight learned here about CBT principles.

## 2018-08-28 NOTE — PROGRESS NOTES
Problem: Suicide/Homicide (Adult/Pediatric) Goal: *STG: Seeks staff when feelings of self harm or harm towards others arise Patient will talk with staff every shift while awake re: feelings of self harm should they arise throughout hospital stay. Outcome: Progressing Towards Goal 
Patient is progressing as evidence by agreeing to come to staff if feelings of self harm or harm to others arise. Patient denies the above feelings/thoughts at this time. Goal: *STG: Attends activities and groups Patient will attend at least 2 groups/activities every day throughout hospital stay. Outcome: Progressing Towards Goal 
Patient is progressing as evidence by attending all groups and activities during this nurse's shift. Patient has been active and appropriate with questions and responses. Patient has demonstrated marked improvement with staff and peer interaction compared to last shift with this nurse. Patient has been \"outgoing\" and appropriate with interactions. Patient has bright affect and denies pain or other medical complaints at this time. Patient has not demonstrated behaviors of self harm or harm to others at this time and agrees to alert staff if the above arise. Patient has no scheduled medications and has not required PRN medications. Patient has eaten all meals and snacks and has been free from falls during this shift. Patient denies questions or concerns regarding court process tomorrow. Will continue to monitor and provide safe and therapeutic interventions as needed and as appropriate.

## 2018-08-28 NOTE — BH NOTES
Pt spent most of the shift on the telephone. Pt received a visit from two male friends. Pt refused  group. When in the milieu pt didn't interact with  staff or peers. Pt. denies SI,HI and AVH today. Pt contracts for safety on the unit. Pt.denies any new medical/pain issues. Staff encouraged Pt. to communicate concerns to the Treatment Team to ensure accurate treatment is addressed. Pt agreed. Pt. remain free of falls and provided non skid socks. Staff will continue to monitor Pt. for behavior safety and location.

## 2018-08-28 NOTE — BSMART NOTE
OCCUPATIONAL THERAPY PROGRESS NOTE Group Time:  1430 Attendance: The patient attended full group. Participation: The patient participated with moderate elaboration in the activity. Manuel Brooke Attention: The patient was able to focus on the activity. Interaction: The patient frequently interacts with others. Discussed issues related to not initially having anywhere to live (says can go to sister's for now), wanting to join the service (unsure if hospital stay will impact that), and able to ID liking self and moving forward as goals. Manuel Brooke

## 2018-08-28 NOTE — BH NOTES
GROUP THERAPY PROGRESS NOTE Mir Gutierrez is participating in [employer]. Group time: 30 minutes Personal goal for participation: Just for Today Goal orientation: personal 
 
Group therapy participation: minimal 
 
Therapeutic interventions reviewed and discussed:  
 
Impression of participation:  Pt plans too participate in groups and activities and to  
 take medication as prescribed. Reports possible discharge tomorrow.

## 2018-08-28 NOTE — BSMART NOTE
ART THERAPY GROUP PROGRESS NOTE PATIENT SCHEDULED FOR GROUP AT: 156 ATTENDANCE: Full PARTICIPATION LEVEL: Participates fully in the art process ATTENTION LEVEL : Able to focus on task FOCUS: Internal locus of control SYMBOLIC & THEMATIC CONTENT AS NOTED IN IMAGERY: He was calm, compliant, and pleasant. He was invested in the task at hand and actively participated in group discussion.

## 2018-08-28 NOTE — BH NOTES
GROUP THERAPY PROGRESS NOTE 509 PHILLIP Koch is participating in Recreational Therapy. Group time: 3286 Personal goal for participation: fresh air break/games on the unit Goal orientation: social 
 
Group therapy participation: active Therapeutic interventions reviewed and discussed: Staff encouraged Pt to get off the unit and go outside and get some fresh air, or play games on the unit with peers. Impression of participation: Pt chose to go off the unit play games in the recreation room, and walk outside for fresh air.

## 2018-08-28 NOTE — BSMART NOTE
SOCIAL WORK GROUP THERAPY PROGRESS NOTE Group Time:  11am 
 
Group Topic:  Coping Skills    C D Issues Group Participation:   
 
Pt moderately involved during group discussion but remained attentive. \"Seven Steps\" for taking responsibility for our Happiness was reviewed including commitment to change, self-care, setting limits, goal setting & letting go. Explored \"my body's\" anger warning signs as well as common triggers.

## 2018-08-28 NOTE — PROGRESS NOTES
[address] [facility] Physician Daily Progress Note Patient:  Ann[place] Floor Age:  21 y.o. :  2000 SEX:  male MRN:  [id number] CSN:  [id number] Admit Date:  2018 Attending:  Lexii Wesley MD 
 
Subjective: 21 year  Old AA male who reports previous h/o depression. He was brought to  ED by police. Patient stated that he had a fight and broke up with the girlfriend. Patient later returned to his girlfriend's house and girlfriend said that patient can not live with her. Patient reportedly said, if I can not live with my girlfriend, then I will kill myself. Girlfriend called the police and patient was found on a bridge. He states that  Used suicidal  Thoughts as a  'bluff' to get back with his girl friend. He states his GF needs  his own space and he cannot live with her. He states he was raised by his grandmother, mother passed away. States his father is homeless as well. He finished [employer]. He works at a [employer]. Reportedly his father also mental armand issues and he  has not seen his father in 13 years. He gave permission to talk to his sister . He refuses to take medications. He denies any SI, HI today. He is due for court hearing in am.  
 
Current Medications:   
Current Facility-Administered Medications Medication Dose Route Frequency Provider Last Rate Last Dose  haloperidol (HALDOL) tablet 5 mg  5 mg Oral Q6H PRN Nestor Hernandez MD      
 haloperidol lactate (HALDOL) injection 5 mg  5 mg IntraMUSCular Q6H PRN Nestor Hernandez MD      
 hydrOXYzine pamoate (VISTARIL) capsule 50 mg  50 mg Oral Q4H PRN Nestor Hernandez MD      
 ibuprofen (MOTRIN) tablet 400 mg  400 mg Oral Q6H PRN Nestor Hernandez MD      
 LORazepam (ATIVAN) injection 1 mg  1 mg IntraMUSCular Q6H PRPHILLIP Hernandez MD      
 LORazepam (ATIVAN) tablet 1 mg  1 mg Oral Q6H PRPHILLIP Hernandez MD      
  traZODone (DESYREL) tablet 50 mg  50 mg Oral QHS PRN Nestor Freeman MD      
 LORazepam (ATIVAN) injection 2 mg  2 mg IntraMUSCular Q6H PRN Nestor Freeman MD      
 LORazepam (ATIVAN) tablet 2 mg  2 mg Oral Q6H PRN Nestor Freeman MD      
 benztropine (COGENTIN) tablet 2 mg  2 mg Oral Q4H PRN Nestor Freeman MD      
 benztropine (COGENTIN) injection 2 mg  2 mg IntraMUSCular Q4H PRN Nestor Freeman MD      
 benztropine (COGENTIN) injection 1 mg  1 mg IntraMUSCular Q4H PRN Nestor Freeman MD      
 benztropine (COGENTIN) tablet 1 mg  1 mg Oral Q4H PRN Nestor Freeman MD      
 
 
Compliant with medication:  Yes Side effects from medications:  No  
 
Mental Status Exam 
 
Appearance   
General Behavior   Pleasant and cooperative    
Speech form and content, 
Language Associations Form of Thought   Normal flow and volume TP : Logical, goal oriented Mood, Affect Self-Attitude Vital Sense SI/HI/PDW   Deprssed No SI, HI, hopelessness Abnormal Perceptions and illusions   Denies    
Delusions   None Anxiety    Denies COGNITION Intelligence Abstraction   Intact Judgement Insight    Limited Diagnoses/Impressions:       
Psychiatric:  
 Active Problems: 
  Suicidal behavior (8/27/2018) POA: Unknown Depression (8/27/2018) POA: Unknown Suicidal thoughts (8/27/2018) POA: Unknown Medical:  
 
Visit Vitals  /70 (BP 1 Location: Right arm, BP Patient Position: Sitting)  Pulse 77  Temp 97.3 °F (36.3 °C)  Resp 16  
 Ht 6' 1\" (1.854 m)  Wt 61.2 kg (135 lb)  SpO2 99%  BMI 17.81 kg/m2 No lab exists for component: 24H Recommendations/Plan:   
 
[]  Dangerous and will not contract for safety in the community []  Response to medications is not adequate 
 
[]  Appropriate disposition not finalized 
 
[]  Collateral history needed to determine safety [x]  Continue current medications and follow MSE for sxs improvement []  Medication changes as follows:  
 
[x]  Continue to build rapport [x]  Supportive psychotherapy [x]  Insight oriented therapy [x]  Group attendance/processing 
 
[x]  Relapse prevention 
 
 
[]  Somatic Management [x]  Disposition planning               Nestor Oropeza MD               8/28/2018          9:24 AM

## 2018-08-29 VITALS
WEIGHT: 135 LBS | DIASTOLIC BLOOD PRESSURE: 75 MMHG | TEMPERATURE: 97.6 F | OXYGEN SATURATION: 99 % | SYSTOLIC BLOOD PRESSURE: 116 MMHG | BODY MASS INDEX: 17.89 KG/M2 | RESPIRATION RATE: 17 BRPM | HEIGHT: 73 IN | HEART RATE: 60 BPM

## 2018-08-29 NOTE — DISCHARGE INSTRUCTIONS
BEHAVIORAL HEALTH NURSING DISCHARGE NOTE      Emergency Numbers    : [facility] Emergency Services: [phone]  Suicide Prevention Line: [phone] (TALK)      The following personal items collected during your admission are returned to you:   Dental Appliance: Dental Appliances: None  Vision: Visual Aid: None  Hearing Aid:    Jewelry: Jewelry: Earrings, Other (comment) (sudarshan ear ring pt kept nose ring)  Clothing: Clothing: Footwear, Jacket/Coat, Pants  Other Valuables: Other Valuables: Cell Phone  Valuables sent to safe: Personal Items Sent to Safe: none        The discharge information has been reviewed with the patient. The patient verbalized understanding. [employer] Activation    Thank you for requesting access to [facility]. Please follow the instructions below to securely access and download your online medical record. [employer] allows you to send messages to your doctor, view your test results, renew your prescriptions, schedule appointments, and more. How Do I Sign Up? 1. In your internet browser, go to [email or web address]  2. Click on the First Time User? Click Here link in the Sign In box. You will be redirect to the New Member Sign Up page. 3. Enter your [employer] Access Code exactly as it appears below. You will not need to use this code after youve completed the sign-up process. If you do not sign up before the expiration date, you must request a new code. [employer] Access Code: [email or web address]  Expires: 2018 12:33 AM (This is the date your [employer] access code will )    4. Enter the last four digits of your Social Security Number (xxxx) and Date of Birth (mm/dd/yyyy) as indicated and click Submit. You will be taken to the next sign-up page. 5. Create a [employer] ID. This will be your [employer] login ID and cannot be changed, so think of one that is secure and easy to remember. 6. Create a [employer] password.  You can change your password at any time.  7. Enter your Password Reset Question and Answer. This can be used at a later time if you forget your password. 8. Enter your e-mail address. You will receive e-mail notification when new information is available in [address] 9. Click Sign Up. You can now view and download portions of your medical record. 10. Click the Download Summary menu link to download a portable copy of your medical information. Additional Information    If you have questions, please visit the Frequently Asked Questions section of the [employer] website at [email or web address] Remember, [facility] is NOT to be used for urgent needs. For medical emergencies, dial 911.     Patient armband removed and shredded

## 2018-08-29 NOTE — ROUTINE PROCESS
Treatment team [facility] - Medical Director: _____present Psychiatrist: __x___present Charge nurse: __x___present MSW: _x___present : __x___present Nurse Manager: __x___present Student RNs: _____present Medical Students: _____present Art Therapist: _x____present Clinical Coordinator: _____present Occupational Therapist: __x___present : _______ present UR  ____x___ present Crisis Supervisor___x____present Plan of care discussed and updated as appropriate.

## 2018-08-29 NOTE — BH NOTES
GROUP THERAPY PROGRESS NOTE Mir Gutierrez could not participate in community group because he was in court determining discharge.

## 2018-08-30 NOTE — DISCHARGE SUMMARY
[facility]  Inpatient Psychiatry   Discharge Summary     Admit date: 8/26/2018    Discharge date and time: 8/29/2018 11:40 AM    Discharge Physician: David Floyd MD    DISCHARGE DIAGNOSES     Psychiatric Diagnoses:   Patient Active Problem List   Diagnosis Code    Suicidal behavior R46.89    Depression F32.9    Suicidal thoughts R45.851       Level of impairment/disability: None    HOSPITAL COURSE   History of Present Illness:  21 year  Old AA male who reports previous h/o depression. He was brought to  ED by police. Patient stated that he had a fight and broke up with the girlfriend. Patient later returned to his girlfriend's house and girlfriend said that patient can not live with her. Patient reportedly said, if I can not live with my girlfriend, then I will kill myself. Girlfriend called the police and patient was found on a bridge. He states that  Used suicidal  Thoughts as a  'bluff' to get back with his girl friend. He states his GF needs  her own space and he cannot live with her. He states he was raised by his grandmother, mother passed away. States his father is homeless as well. He finished HS. He works at a [employer]. Reportedly his father also mental armand issues and he  has not seen his father in 13 years. He gave permission to talk to his sister . He refuses to take medications. Pt admitted and monitored for depression and SI. Pt showed euthymic mood on the milieu and repeatedly denied SI. Reported he was just trying to manipulate his girlfriend into taking him back so he will not be homeless. Pt refused to take antidepressant stating he did not need it. He was not a behavior issue on the milieu and didn't require seclusion or restraint. He was not agitated. He was court ordered to be discharged. He does not present as a danger to himself or others at this time. He is going to live with a friend.      DISPOSITION/FOLLOW-UP     Disposition: Home with friend    Follow-up Appointments: Follow-up Information     Follow up With Details Comments [address]  Patient will follow up with outpatient care with [facility] with walk in intake hours: Monday through Thursday from 8:15am to 11:15am and 2:15pm to 4:15pm.  [address] [address]  Phone: [phone]      None   None (395) Patient stated that they have no PCP              MEDICATION CHANGES   Outpatient medications:  No current facility-administered medications on file prior to encounter. No current outpatient prescriptions on file prior to encounter. Medications discontinued during hospitalization:  Medications Discontinued During This Encounter   Medication Reason    benztropine (COGENTIN) tablet 1 mg Patient Discharge    benztropine (COGENTIN) injection 1 mg Patient Discharge    benztropine (COGENTIN) injection 2 mg Patient Discharge    benztropine (COGENTIN) tablet 2 mg Patient Discharge    LORazepam (ATIVAN) tablet 2 mg Patient Discharge    LORazepam (ATIVAN) injection 2 mg Patient Discharge    traZODone (DESYREL) tablet 50 mg Patient Discharge    LORazepam (ATIVAN) tablet 1 mg Patient Discharge    LORazepam (ATIVAN) injection 1 mg Patient Discharge    ibuprofen (MOTRIN) tablet 400 mg Patient Discharge    hydrOXYzine pamoate (VISTARIL) capsule 50 mg Patient Discharge    haloperidol lactate (HALDOL) injection 5 mg Patient Discharge    haloperidol (HALDOL) tablet 5 mg Patient Discharge         Discharged medication:  There are no discharge medications for this patient. Instructions, risks (black box warning), benefits and side effects (EPS, TD, NMS) were discussed in detail prior to discharge. Patient denied any adverse medication side effects prior to discharge.        LABS/IMAGING DURING ADMISSION     Results for orders placed or performed during the hospital encounter of 08/26/18   CBC WITH AUTOMATED DIFF   Result Value Ref Range    WBC 5.5 4.6 - 13.2 K/uL    RBC 4.49 (L) 4.70 - 5.50 M/uL    HGB 12.7 (L) 13.0 - 16.0 g/dL    HCT 36.8 36.0 - 48.0 %    MCV 82.0 74.0 - 97.0 FL    MCH 28.3 24.0 - 34.0 PG    MCHC 34.5 31.0 - 37.0 g/dL    RDW 12.5 11.6 - 14.5 %    PLATELET 743 174 - 188 K/uL    MPV 9.5 9.2 - 11.8 FL    NEUTROPHILS 54 40 - 73 %    LYMPHOCYTES 34 21 - 52 %    MONOCYTES 9 3 - 10 %    EOSINOPHILS 3 0 - 5 %    BASOPHILS 0 0 - 2 %    ABS. NEUTROPHILS 3.0 1.8 - 8.0 K/UL    ABS. LYMPHOCYTES 1.8 0.9 - 3.6 K/UL    ABS. MONOCYTES 0.5 0.05 - 1.2 K/UL    ABS. EOSINOPHILS 0.2 0.0 - 0.4 K/UL    ABS. BASOPHILS 0.0 0.0 - 0.1 K/UL    DF AUTOMATED     METABOLIC PANEL, COMPREHENSIVE   Result Value Ref Range    Sodium 139 136 - 145 mmol/L    Potassium 3.3 (L) 3.5 - 5.5 mmol/L    Chloride 105 100 - 108 mmol/L    CO2 26 21 - 32 mmol/L    Anion gap 8 3.0 - 18 mmol/L    Glucose 98 74 - 99 mg/dL    BUN 17 7.0 - 18 MG/DL    Creatinine 0.76 0.6 - 1.3 MG/DL    BUN/Creatinine ratio 22 (H) 12 - 20      GFR est AA >60 >60 ml/min/1.73m2    GFR est non-AA >60 >60 ml/min/1.73m2    Calcium 9.3 8.5 - 10.1 MG/DL    Bilirubin, total 0.7 0.2 - 1.0 MG/DL    ALT (SGPT) 35 16 - 61 U/L    AST (SGOT) 26 15 - 37 U/L    Alk.  phosphatase 73 45 - 117 U/L    Protein, total 8.2 6.4 - 8.2 g/dL    Albumin 4.3 3.4 - 5.0 g/dL    Globulin 3.9 2.0 - 4.0 g/dL    A-G Ratio 1.1 0.8 - 1.7     ETHYL ALCOHOL   Result Value Ref Range    ALCOHOL(ETHYL),SERUM <3 0 - 3 MG/DL   URINALYSIS W/ RFLX MICROSCOPIC   Result Value Ref Range    Color DARK YELLOW      Appearance CLEAR      Specific gravity >1.030 (H) 1.005 - 1.030    pH (UA) 6.0 5.0 - 8.0      Protein 100 (A) NEG mg/dL    Glucose NEGATIVE  NEG mg/dL    Ketone 40 (A) NEG mg/dL    Bilirubin SMALL (A) NEG      Blood NEGATIVE  NEG      Urobilinogen 1.0 0.2 - 1.0 EU/dL    Nitrites NEGATIVE  NEG      Leukocyte Esterase TRACE (A) NEG     DRUG SCREEN, URINE   Result Value Ref Range    BENZODIAZEPINES NEGATIVE  NEG      BARBITURATES NEGATIVE  NEG      THC (TH-CANNABINOL) POSITIVE (A) NEG      OPIATES NEGATIVE  NEG      PCP(PHENCYCLIDINE) NEGATIVE  NEG      COCAINE NEGATIVE  NEG      AMPHETAMINES NEGATIVE  NEG      METHADONE NEGATIVE  NEG      HDSCOM (NOTE)    URINE MICROSCOPIC ONLY   Result Value Ref Range    WBC 4 to 10 0 - 4 /hpf    RBC NONE 0 - 5 /hpf    Epithelial cells FEW 0 - 5 /lpf    Bacteria NEGATIVE  NEG /hpf    Mucus 4+ (A) NEG /lpf    Other: RENAL TUBULAR EPITHELIAL CELLS     TSH 3RD GENERATION   Result Value Ref Range    TSH 0.87 0.36 - 3.74 uIU/mL        DISCHARGE MENTAL STATUS EVALUATION     Appearance/Hygiene 21 y.o. BLACK OR  male  Hygiene: Good   Attitude/Behavior/Social Relatedness Appropriate   Musculoskeletal Gait/Station: appropriate  Tone (flaccid, cogwheeling, spastic): not assessed  Psychomotor (hyperkinetic, hypokinetic): calm  Involuntary movements (tics, dyskinesias, akathisa, stereotypies): none   Speech   Rate, rhythm, volume, fluency and articulation are appropriate   Mood   euthymic   Affect    congruent   Thought Process Linear and goal directed   Thought Content and Perceptual Disturbances Denies self-injurious behavior (SIB), suicidal ideation (SI), aggressive behavior or homicidal ideation (HI)    Denies auditory and visual hallucinations   Sensorium and Cognition  Grossly intact   Insight  fair   Judgment fair       SUICIDE RISK ASSESSMENT     [] Admission  [x] Discharge     Key Factors:   Current admission precipitated by suicide attempt?   []  Yes     2    [x]  No     1     Suicide Attempt History  [] Past attempts of high lethality    2 []  Past attempts of low lethality    1 [x]  No previous attempts       0   Suicidal Ideation []  Constant suicidal thoughts      2 []  Intermittent or fleeting suicidal  thoughts  1 [x]  Denies current suicidal thoughts    0   Suicide Plan   []  Has plan with actual OR potential access to planned method    2 []  Has plan without access to planned method      1 [x]  No plan            0   Plan Lethality []  Highly lethal plan (Carbon monoxide, gun, hanging, jumping)    2 []  Moderate lethality of plan          1 [x]  Low lethality of plan (biting, head banging, superficial scratching, pillow over face)  0   Safety Plan Agreement  []  Unwilling OR unable to agree due to impaired reality testing   2   []  Patient is ambivalent and/or guarded      1 [x]  Reliably agrees        0   Current Morbid Thoughts (reunion fantasies, preoccupations with death) []  Constantly     2     []  Frequently    1 [x]  Rarely    0   Elopement Risk  []  High risk     2 []  Moderate risk    1 [x]   Low risk    0   Symptoms    []  Hopeless  []  Helpless  []  Anhedonia   []  Guilt/shame  []  Anger/rage  []  Anxiety  []  Insomnia   []  Agitation   []  Impulsivity  []  5-6 symptoms present    2 []  3-4 symptoms present    1  [x]  0-2 symptoms present    0     Scoring Key:  10 or higher = Imminent Risk (consider 1:1)  4 - 9 = Moderate Risk (consider q 15 minute observation)Attended alcohol, tobacco, prescription and other drug psychoeducation group.   0 - 3 = Low Risk (consider q 30 minute observation)    Total Score: 1  ------------------------------------------------------------------------------------------------------------------  PLEASE ADDRESS THE FOLLOWING 5 ISSUES     Physician's Subjective Appraisal of Risk (check one):  []  Patient replies not trustworthy: several non-verbal cues. []  Patient replies questionable: trustworthy: at least 1 non-verbal cue. [x]  Patient replies appear trustworthy. Family History of Suicide?    []  Yes  [x]  No    Protective measures (select all that apply):  []  Successful past responses to stress  []  Spiritual/Islam beliefs  [x]  Capacity for reality testing  []  Positive therapeutic relationships  [x]  Social supports/connections  [x]  Positive coping skills  []  Frustration tolerance/optimism  []  Children or pets in the home  []  Sense of responsibility to family  [x]  Agrees to treatment plan and follow up    Others (list):    High Risk Diagnoses (select all that apply):  [x]  Depression/Bipolar Disorder  []  Dual Diagnosis  []  Cardiovascular Disease  []  Schizophrenia  []  Chronic Pain  []  Epilepsy  []  Cancer  []  Personality Disorder  []  HIV/AIDS  []  Multiple Sclerosis    Dangerousness Assessment (Suicide, homicide, property destruction. ..)    Risk Factors reviewed and risk assessed to be:  [x] low  [] low-moderate  [] moderate   [] moderate-high  [] high     Protection factors reviewed and risk assessed to be:  [x] low  [] low-moderate  [] moderate   [] moderate-high  [] high     Response to treatment and risk assessed to be:  [x] low  [] low-moderate  [] moderate   [] moderate-high  [] high     Support reviewed and risk assessed to be:  [x] low  [] low-moderate  [] moderate   [] moderate-high  [] high     Acceptance of Discharge and outpatient treatment reviewed and risk assessed to be:    [x] low  [] low-moderate  [] moderate   [] moderate-high  [] high   Overall risk assessed to be:  [x] low  [] low-moderate  [] moderate   [] moderate-high  [] high     Completion of discharge was greater than 30 minutes. Over 50% of today's discharge was geared towards counseling and coordination of care.           Mari Salinas MD  Psychiatry   [facility]

## 2019-08-26 ENCOUNTER — HOSPITAL ENCOUNTER (EMERGENCY)
Age: 19
Discharge: HOME | End: 2019-08-26
Attending: EMERGENCY MEDICINE
Payer: SELF-PAY

## 2019-08-26 VITALS
TEMPERATURE: 98.8 F | HEIGHT: 73 IN | OXYGEN SATURATION: 100 % | BODY MASS INDEX: 18.95 KG/M2 | HEART RATE: 77 BPM | SYSTOLIC BLOOD PRESSURE: 116 MMHG | RESPIRATION RATE: 12 BRPM | DIASTOLIC BLOOD PRESSURE: 74 MMHG | WEIGHT: 143 LBS

## 2019-08-26 DIAGNOSIS — Z20.2 STD EXPOSURE: Primary | ICD-10-CM

## 2019-08-26 PROCEDURE — 87661 TRICHOMONAS VAGINALIS AMPLIF: CPT

## 2019-08-26 PROCEDURE — 74011250637 HC RX REV CODE- 250/637: Performed by: EMERGENCY MEDICINE

## 2019-08-26 PROCEDURE — 74011250636 HC RX REV CODE- 250/636: Performed by: EMERGENCY MEDICINE

## 2019-08-26 PROCEDURE — 99282 EMERGENCY DEPT VISIT SF MDM: CPT

## 2019-08-26 PROCEDURE — 87491 CHLMYD TRACH DNA AMP PROBE: CPT

## 2019-08-26 PROCEDURE — 96372 THER/PROPH/DIAG INJ SC/IM: CPT

## 2019-08-26 RX ORDER — AZITHROMYCIN 250 MG/1
1000 TABLET, FILM COATED ORAL
Status: COMPLETED | OUTPATIENT
Start: 2019-08-26 | End: 2019-08-26

## 2019-08-26 RX ORDER — METRONIDAZOLE 500 MG/1
2000 TABLET ORAL
Status: COMPLETED | OUTPATIENT
Start: 2019-08-26 | End: 2019-08-26

## 2019-08-26 RX ADMIN — LIDOCAINE HYDROCHLORIDE 250 MG: 10 INJECTION, SOLUTION EPIDURAL; INFILTRATION; INTRACAUDAL; PERINEURAL at 14:59

## 2019-08-26 RX ADMIN — METRONIDAZOLE 2000 MG: 500 TABLET, FILM COATED ORAL at 14:59

## 2019-08-26 RX ADMIN — AZITHROMYCIN 1000 MG: 250 TABLET, FILM COATED ORAL at 14:59

## 2019-08-26 NOTE — ED PROVIDER NOTES
EMERGENCY DEPARTMENT HISTORY AND PHYSICAL EXAM    Date: 8/26/2019  Patient Name: Sasha Davis    History of Presenting Illness     Chief Complaint   Patient presents with    Exposure to STD         History Provided By: Patient      Additional History (Context): Sasha Davis is a 19 y.o. male with No significant past medical history who presents with being informed that his partner tested positive for chlamydia. Denies any urethral discharge, genital sores, inguinal lymphadenopathy, arthralgias joint swelling or vision changes or rash elsewhere. PCP: None    Current Facility-Administered Medications   Medication Dose Route Frequency Provider Last Rate Last Dose    metroNIDAZOLE (FLAGYL) tablet 2,000 mg  2,000 mg Oral NOW Aarti Fontenot PA        cefTRIAXone (ROCEPHIN) 250 mg in lidocaine (PF) (XYLOCAINE) 10 mg/mL (1 %) IM injection  250 mg IntraMUSCular ONCE Aarti Fontenot PA        azithromycin (ZITHROMAX) tablet 1,000 mg  1,000 mg Oral NOW Aarti Fontenot PA           Past History     Past Medical History:  History reviewed. No pertinent past medical history. Past Surgical History:  Past Surgical History:   Procedure Laterality Date    HX UROLOGICAL      testical surgery 2018       Family History:  History reviewed. No pertinent family history. Social History:  Social History     Tobacco Use    Smoking status: Never Smoker    Smokeless tobacco: Never Used   Substance Use Topics    Alcohol use: No    Drug use: No       Allergies:  No Known Allergies      Review of Systems   Review of Systems   Genitourinary: Negative for decreased urine volume, discharge, dysuria, enuresis, flank pain, frequency, genital sores, hematuria, penile pain, penile swelling, scrotal swelling, testicular pain and urgency.      All Other Systems Negative  Physical Exam     Vitals:    08/26/19 1431   BP: 116/74   Pulse: 77   Resp: 12   Temp: 98.8 °F (37.1 °C)   SpO2: 100%   Weight: 64.9 kg (143 lb)   Height: 6' 1\" (1.854 m)     Physical Exam   Constitutional: He is oriented to person, place, and time. Vital signs are normal. He appears well-developed and well-nourished. He is active. Non-toxic appearance. He does not appear ill. No distress. HENT:   Head: Normocephalic and atraumatic. Neck: Normal range of motion. Neck supple. Carotid bruit is not present. No tracheal deviation present. No thyromegaly present. Cardiovascular: Normal rate, regular rhythm and normal heart sounds. Exam reveals no gallop and no friction rub. No murmur heard. Pulmonary/Chest: Effort normal and breath sounds normal. No stridor. No respiratory distress. He has no wheezes. He has no rales. He exhibits no tenderness. Abdominal: Soft. He exhibits no distension and no mass. There is no tenderness. There is no rebound, no guarding and no CVA tenderness. Musculoskeletal: Normal range of motion. Neurological: He is alert and oriented to person, place, and time. Skin: Skin is warm, dry and intact. He is not diaphoretic. No pallor. Psychiatric: He has a normal mood and affect. His speech is normal and behavior is normal. Judgment and thought content normal.   Nursing note and vitals reviewed. Diagnostic Study Results     Labs -   No results found for this or any previous visit (from the past 12 hour(s)). Radiologic Studies -   No orders to display     CT Results  (Last 48 hours)    None        CXR Results  (Last 48 hours)    None            Medical Decision Making   I am the first provider for this patient. I reviewed the vital signs, available nursing notes, past medical history, past surgical history, family history and social history. Vital Signs-Reviewed the patient's vital signs. Provider Notes (Medical Decision Making): give STD txmt now.   MED RECONCILIATION:  Current Facility-Administered Medications   Medication Dose Route Frequency    metroNIDAZOLE (FLAGYL) tablet 2,000 mg  2,000 mg Oral NOW    cefTRIAXone (ROCEPHIN) 250 mg in lidocaine (PF) (XYLOCAINE) 10 mg/mL (1 %) IM injection  250 mg IntraMUSCular ONCE    azithromycin (ZITHROMAX) tablet 1,000 mg  1,000 mg Oral NOW     No current outpatient medications on file. Disposition:  home    DISCHARGE NOTE:   2:41 PM    Pt has been reexamined. Patient has no new complaints, changes, or physical findings. Care plan outlined and precautions discussed. Results of exam, labs were reviewed with the patient. All medications were reviewed with the patient. All of pt's questions and concerns were addressed. Patient was instructed and agrees to follow up with HD, as well as to return to the ED upon further deterioration. Patient is ready to go home. Follow-up Information     Follow up With Specialties Details Why [address] [place] [address]  Schedule an appointment as soon as possible for a visit in 1 day  [address] [place] [id number]  [phone]    [facility] EMERGENCY DEPT Emergency Medicine  If symptoms worsen return immediately [address] [facility] [address]  [phone]          There are no discharge medications for this patient. Diagnosis     Clinical Impression:   1.  STD exposure

## 2019-08-26 NOTE — ED TRIAGE NOTES
The patient presents for evaluation of an STD. He states that his partner was positive for Chlamydia.

## 2019-08-26 NOTE — DISCHARGE INSTRUCTIONS
Patient Education        Learning How to Use a Male Condom  What is a male condom? Condoms can be used to prevent pregnancy. They can also help protect against sexually transmitted infections (STIs). You must use a new condom every time you have sex. Condoms prevent pregnancy by keeping sperm and eggs apart. The condom holds the sperm so the sperm can't get into the vagina. A male condom is a tube of soft rubber or plastic with a closed end. It fits over the penis. There are many kinds of male condoms. Some condoms are lubricated. Some are ribbed. Most have a \"reservoir tip\" for holding the semen. You can also buy condoms of different sizes. How do you use a condom? Condoms work best if you follow these steps. · Use a new condom each time you have sex. · Check the condom's expiration date. Do not use it past that date. · When opening the condom wrapper, be sure not to poke a hole in the condom with your fingernails, teeth, or other sharp objects. · Put the condom on as soon as the penis is hard (erect) and before any sexual contact with your partner. ? First, hold the tip of the condom and squeeze out the air. This leaves room for the semen after you ejaculate. ? If you are not circumcised, pull down the loose skin from the head of the penis (foreskin) before you put on the condom. ? Hold on to the tip of the condom as you unroll the condom. Unroll it all the way down to the base of the penis. · After you ejaculate, hold on to the condom at the base of the penis, and withdraw from your partner while your penis is still erect. This will keep semen from spilling out of the condom. · Wash your hands after you handle a used condom. How do you buy and store condoms? · Male condoms may be available for free at family planning clinics. You can buy them without a prescription at drugstores, online, and in some grocery stores.   · Keep condoms wrapped in their original packages until you are ready to use them. Store them in a cool, dry place out of direct sunlight. · Don't keep rubber (latex) condoms in a glove compartment or other hot places for a long time. Heat weakens latex and increases the chance that the condom will break. · Don't use condoms in damaged packages. And don't use condoms that are brittle, sticky, or discolored, even if they are not past their expiration date. What else do you need to know? · To protect yourself and your partner from STIs, use a condom during vaginal, oral, or anal sex. · If the condom breaks or you think sperm may have leaked out into the vagina, the woman can use emergency contraception to help prevent pregnancy. The most effective emergency contraception is prescribed by a doctor. This includes the copper IUD (inserted by a doctor) or a prescription pill. You can also get emergency contraceptive pills without a prescription at [facility]. · Use a male condom with another form of birth control. It's the best way to prevent pregnancy. ? You can use the condom with hormonal contraception, an intrauterine device (IUD), a diaphragm, a sponge, a shield, or a cervical cap. ? Don't use a male condom with a female condom. ? Use spermicide as its instructions say. Don't put spermicide inside a condom. · If you or your partner gets a rash or feels itchy after using a latex condom, talk to your doctor. You may have a latex allergy. Where can you learn more? Go to http://abraham-ramy.info/. Enter C582 in the search box to learn more about \"Learning How to Use a Male Condom. \"  Current as of: September 5, 2018  Content Version: 12.1  © 4459-7715 [employer]. Care instructions adapted under license by [employer] (which disclaims liability or warranty for this information).  If you have questions about a medical condition or this instruction, always ask your healthcare professional. Keven Newell disclaims any warranty or liability for your use of this information. Patient Education        Exposure to Sexually Transmitted Infections: Care Instructions  Your Care Instructions  Sexually transmitted infections (STIs) are those diseases spread by sexual contact. There are at least 20 different STIs, including chlamydia, gonorrhea, syphilis, and human immunodeficiency virus (HIV), which causes AIDS. Bacteria-caused STIs can be treated and cured. STIs caused by viruses, such as HIV, can be treated but not cured. Some STIs can reduce a woman's chances of getting pregnant in the future. STIs are spread during sexual contact, such as vaginal intercourse and oral or anal sex. Follow-up care is a key part of your treatment and safety. Be sure to make and go to all appointments, and call your doctor if you are having problems. It's also a good idea to know your test results and keep a list of the medicines you take. How can you care for yourself at home? · Your doctor may have given you a shot of antibiotics. If your doctor prescribed antibiotic pills, take them as directed. Do not stop taking them just because you feel better. You need to take the full course of antibiotics. · Do not have sexual contact while you have symptoms of an STI or are being treated for an STI. · Tell your sex partner (or partners) that he or she will need treatment. · If you are a woman, do not douche. Douching changes the normal balance of bacteria in the vagina and may spread an infection up into your reproductive organs. To prevent exposure to STIs in the future  · Use latex condoms every time you have sex. Use them from the beginning to the end of sexual contact. · Talk to your partner before you have sex. Find out if he or she has or is at risk for any STI. Keep in mind that a person may be able to spread an STI even if he or she does not have symptoms. · Do not have sex if you are being treated for an STI.   · Do not have sex with anyone who has symptoms of an STI, such as sores on the genitals or mouth. · Having one sex partner (who does not have STIs and does not have sex with anyone else) is a good way to avoid STIs. When should you call for help? Call your doctor now or seek immediate medical care if:    · You have new pain in your belly or pelvis.     · You have symptoms of a urinary tract infection. These may include:  ? Pain or burning when you urinate. ? A frequent need to urinate without being able to pass much urine. ? Pain in the flank, which is just below the rib cage and above the waist on either side of the back. ? Blood in your urine. ? A fever.     · You have new or worsening pain or swelling in the scrotum.    Watch closely for changes in your health, and be sure to contact your doctor if:    · You have unusual vaginal bleeding.     · You have a discharge from the vagina or penis.     · You have any new symptoms, such as sores, bumps, rashes, blisters, or warts.     · You have itching, tingling, pain, or burning in the genital or anal area.     · You think you may have an STI. Where can you learn more? Go to http://abraham-ramy.info/. Enter H815 in the search box to learn more about \"Exposure to Sexually Transmitted Infections: Care Instructions. \"  Current as of: September 11, 2018  Content Version: 12.1  © 4444-4699 [employer]. Care instructions adapted under license by [employer] (which disclaims liability or warranty for this information). If you have questions about a medical condition or this instruction, always ask your healthcare professional. [address] any warranty or liability for your use of this information.

## 2019-08-27 LAB
C TRACH RRNA SPEC QL NAA+PROBE: POSITIVE
N GONORRHOEA RRNA SPEC QL NAA+PROBE: NEGATIVE
SPECIMEN SOURCE: ABNORMAL

## 2019-09-03 LAB
SPECIMEN SOURCE: NORMAL
T VAGINALIS RRNA VAG QL NAA+PROBE: NEGATIVE

## 2021-05-28 ENCOUNTER — HOSPITAL ENCOUNTER (EMERGENCY)
Age: 21
Discharge: HOME | End: 2021-05-28
Attending: STUDENT IN AN ORGANIZED HEALTH CARE EDUCATION/TRAINING PROGRAM
Payer: MEDICAID

## 2021-05-28 VITALS
BODY MASS INDEX: 21 KG/M2 | SYSTOLIC BLOOD PRESSURE: 107 MMHG | HEART RATE: 84 BPM | HEIGHT: 71 IN | DIASTOLIC BLOOD PRESSURE: 65 MMHG | WEIGHT: 150 LBS | OXYGEN SATURATION: 97 % | TEMPERATURE: 98.5 F | RESPIRATION RATE: 16 BRPM

## 2021-05-28 DIAGNOSIS — R11.2 NON-INTRACTABLE VOMITING WITH NAUSEA, UNSPECIFIED VOMITING TYPE: ICD-10-CM

## 2021-05-28 DIAGNOSIS — R21 PENILE RASH: Primary | ICD-10-CM

## 2021-05-28 LAB
ALBUMIN SERPL-MCNC: 4.1 G/DL (ref 3.4–5)
ALBUMIN/GLOB SERPL: 1.1 {RATIO} (ref 0.8–1.7)
ALP SERPL-CCNC: 78 U/L (ref 45–117)
ALT SERPL-CCNC: 37 U/L (ref 16–61)
ANION GAP SERPL CALC-SCNC: 7 MMOL/L (ref 3–18)
APPEARANCE UR: ABNORMAL
AST SERPL-CCNC: 29 U/L (ref 10–38)
BACTERIA URNS QL MICRO: ABNORMAL /HPF
BASOPHILS # BLD: 0 K/UL (ref 0–0.1)
BASOPHILS NFR BLD: 0 % (ref 0–2)
BILIRUB SERPL-MCNC: 0.7 MG/DL (ref 0.2–1)
BILIRUB UR QL: NEGATIVE
BUN SERPL-MCNC: 13 MG/DL (ref 7–18)
BUN/CREAT SERPL: 15 (ref 12–20)
CALCIUM SERPL-MCNC: 9.2 MG/DL (ref 8.5–10.1)
CHLORIDE SERPL-SCNC: 103 MMOL/L (ref 100–111)
CO2 SERPL-SCNC: 27 MMOL/L (ref 21–32)
COLOR UR: ABNORMAL
CREAT SERPL-MCNC: 0.86 MG/DL (ref 0.6–1.3)
DIFFERENTIAL METHOD BLD: ABNORMAL
EOSINOPHIL # BLD: 0.1 K/UL (ref 0–0.4)
EOSINOPHIL NFR BLD: 1 % (ref 0–5)
EPITH CASTS URNS QL MICRO: NEGATIVE /LPF (ref 0–5)
ERYTHROCYTE [DISTWIDTH] IN BLOOD BY AUTOMATED COUNT: 12.5 % (ref 11.6–14.5)
GLOBULIN SER CALC-MCNC: 3.8 G/DL (ref 2–4)
GLUCOSE SERPL-MCNC: 105 MG/DL (ref 74–99)
GLUCOSE UR STRIP.AUTO-MCNC: NEGATIVE MG/DL
HCT VFR BLD AUTO: 35.7 % (ref 36–48)
HGB BLD-MCNC: 12 G/DL (ref 13–16)
HGB UR QL STRIP: NEGATIVE
KETONES UR QL STRIP.AUTO: 15 MG/DL
LEUKOCYTE ESTERASE UR QL STRIP.AUTO: ABNORMAL
LIPASE SERPL-CCNC: 55 U/L (ref 73–393)
LYMPHOCYTES # BLD: 0.5 K/UL (ref 0.9–3.6)
LYMPHOCYTES NFR BLD: 5 % (ref 21–52)
MCH RBC QN AUTO: 29 PG (ref 24–34)
MCHC RBC AUTO-ENTMCNC: 33.6 G/DL (ref 31–37)
MCV RBC AUTO: 86.2 FL (ref 74–97)
MONOCYTES # BLD: 0.5 K/UL (ref 0.05–1.2)
MONOCYTES NFR BLD: 5 % (ref 3–10)
NEUTS SEG # BLD: 8.5 K/UL (ref 1.8–8)
NEUTS SEG NFR BLD: 89 % (ref 40–73)
NITRITE UR QL STRIP.AUTO: NEGATIVE
PH UR STRIP: >8.5 [PH] (ref 5–8)
PLATELET # BLD AUTO: 247 K/UL (ref 135–420)
PMV BLD AUTO: 9.6 FL (ref 9.2–11.8)
POTASSIUM SERPL-SCNC: 3.6 MMOL/L (ref 3.5–5.5)
PROT SERPL-MCNC: 7.9 G/DL (ref 6.4–8.2)
PROT UR STRIP-MCNC: 100 MG/DL
RBC # BLD AUTO: 4.14 M/UL (ref 4.35–5.65)
RBC #/AREA URNS HPF: NEGATIVE /HPF (ref 0–5)
RPR SER QL: NONREACTIVE
SODIUM SERPL-SCNC: 137 MMOL/L (ref 136–145)
SP GR UR REFRACTOMETRY: 1.03 (ref 1–1.03)
UROBILINOGEN UR QL STRIP.AUTO: 1 EU/DL (ref 0.2–1)
WBC # BLD AUTO: 9.6 K/UL (ref 4.6–13.2)
WBC URNS QL MICRO: ABNORMAL /HPF (ref 0–4)

## 2021-05-28 PROCEDURE — 85025 COMPLETE CBC W/AUTO DIFF WBC: CPT

## 2021-05-28 PROCEDURE — 86592 SYPHILIS TEST NON-TREP QUAL: CPT

## 2021-05-28 PROCEDURE — 87591 N.GONORRHOEAE DNA AMP PROB: CPT

## 2021-05-28 PROCEDURE — 96372 THER/PROPH/DIAG INJ SC/IM: CPT

## 2021-05-28 PROCEDURE — 96374 THER/PROPH/DIAG INJ IV PUSH: CPT

## 2021-05-28 PROCEDURE — 81001 URINALYSIS AUTO W/SCOPE: CPT

## 2021-05-28 PROCEDURE — 74011250636 HC RX REV CODE- 250/636: Performed by: PHYSICIAN ASSISTANT

## 2021-05-28 PROCEDURE — 80053 COMPREHEN METABOLIC PANEL: CPT

## 2021-05-28 PROCEDURE — 99283 EMERGENCY DEPT VISIT LOW MDM: CPT

## 2021-05-28 PROCEDURE — 83690 ASSAY OF LIPASE: CPT

## 2021-05-28 PROCEDURE — 74011000250 HC RX REV CODE- 250: Performed by: PHYSICIAN ASSISTANT

## 2021-05-28 RX ORDER — ONDANSETRON 4 MG/1
TABLET, ORALLY DISINTEGRATING ORAL
Qty: 10 TABLET | Refills: 0 | Status: SHIPPED | OUTPATIENT
Start: 2021-05-28

## 2021-05-28 RX ORDER — HYDROCORTISONE 10 MG/ML
LOTION TOPICAL 2 TIMES DAILY
Qty: 96 G | Refills: 0 | Status: SHIPPED | OUTPATIENT
Start: 2021-05-28

## 2021-05-28 RX ORDER — ONDANSETRON 2 MG/ML
4 INJECTION INTRAMUSCULAR; INTRAVENOUS
Status: COMPLETED | OUTPATIENT
Start: 2021-05-28 | End: 2021-05-28

## 2021-05-28 RX ORDER — DOXYCYCLINE 100 MG/1
100 CAPSULE ORAL 2 TIMES DAILY
Qty: 14 CAPSULE | Refills: 0 | Status: SHIPPED | OUTPATIENT
Start: 2021-05-28 | End: 2021-06-04

## 2021-05-28 RX ADMIN — ONDANSETRON 4 MG: 2 INJECTION INTRAMUSCULAR; INTRAVENOUS at 19:16

## 2021-05-28 RX ADMIN — LIDOCAINE HYDROCHLORIDE 500 MG: 10 INJECTION, SOLUTION EPIDURAL; INFILTRATION; INTRACAUDAL; PERINEURAL at 19:12

## 2021-05-28 NOTE — LETTER
6/19/2021 [address] [address] Dear Mr. Ramachandranshiv Selwyn: The results of your lab work performed in our office were abnormal and we have had difficulty reaching you by telephone. Please contact our office as soon as possible to discuss these results. Sincerely, No name on file.

## 2021-05-28 NOTE — ED PROVIDER NOTES
[address] [facility],[address] Floor  [facility] EMERGENCY DEPT    Date: 5/28/2021  Patient Name: Chin Thomas    History of Presenting Illness     Chief Complaint   Patient presents with    Penile Discharge    Vomiting    Abdominal Pain     23 y.o. male otherwise healthy presents the ED complaining of a penile rash and discharge for the past 3 days. Patient states he also developed vomiting today as well as some abdominal cramping, states it is in both his right and left lower quadrants. Patient reports vomiting 3 times today, states he is not able to keep down much food or fluid. He reports having one testicle from having a torsion in the past.  Patient denies any fever, chills, diarrhea or constipation, testicle pain, back pain, persistent abd pain, or other symptoms at this time. Patient denies any other associated signs or symptoms. Patient denies any other complaints. Nursing notes regarding the HPI and triage nursing notes were reviewed. Prior medical records were reviewed. Current Outpatient Medications   Medication Sig Dispense Refill    ondansetron (Zofran ODT) 4 mg disintegrating tablet Take 1-2 tablets every 6-8 hours as needed for nausea and vomiting. 10 Tablet 0    hydrocortisone (ALA-TAJ) 1 % lotion Apply  to affected area two (2) times a day. use thin layer to rash 96 g 0    doxycycline (MONODOX) 100 mg capsule Take 1 Capsule by mouth two (2) times a day for 7 days. 14 Capsule 0       Past History     Past Medical History:  History reviewed. No pertinent past medical history. Past Surgical History:  Past Surgical History:   Procedure Laterality Date    HX UROLOGICAL      testical surgery 2018       Family History:  History reviewed. No pertinent family history.     Social History:  Social History     Tobacco Use    Smoking status: Never Smoker    Smokeless tobacco: Never Used   Substance Use Topics    Alcohol use: No    Drug use: No       Allergies:  No Known Allergies    Patient's primary care provider (as noted in EPIC):  None    Review of Systems   Constitutional:  Denies malaise, fever, chills. Respiratory:  Denies cough, wheezing, difficulty breathing, shortness of breath. GI/ABD: + intermittent inferior abd pain, nausea, vomiting. : + penile discharge, rash to penis. Denies injury, pain, dysuria or urgency. Back:  Denies injury or pain. Pelvis:  Denies injury or pain. Extremity/MS:  Denies injury or pain. Neuro:  Denies headache, LOC, dizziness, neurologic symptoms/deficits/paresthesias. Skin: Denies injury, rash, itching or skin changes. All other systems negative as reviewed. Visit Vitals  /65 (BP 1 Location: Left upper arm, BP Patient Position: At rest)   Pulse 84   Temp 98.5 °F (36.9 °C)   Resp 16   Ht 5' 11\" (1.803 m)   Wt 68 kg (150 lb)   SpO2 97%   BMI 20.92 kg/m²       PHYSICAL EXAM:    CONSTITUTIONAL:  Alert, in no apparent distress;  well developed;  well nourished. HEAD:  Normocephalic, atraumatic. EYES:  EOMI. Non-icteric sclera. Normal conjunctiva. NECK:  Supple  RESPIRATORY:  Chest clear, equal breath sounds, good air movement. Without wheezes, rhonchi or rales. CARDIOVASCULAR:  Regular rate and rhythm. No murmurs, rubs, or gallops. GI:  Normal bowel sounds, abdomen soft and non-tender. No rebound or guarding. : Distal shaft/proximal glands with confluent papules noted circumferentially, no discharge noted. Single testicle without any tenderness to palpation or edema. BACK:  Non-tender. UPPER EXT:  Normal inspection. NEURO:  Moves all four extremities, and grossly normal motor exam.  SKIN:  No rashes;  Normal for age. PSYCH:  Alert and normal affect.     MEDICAL DECISION MAKING:    Recent Results (from the past 12 hour(s))   URINALYSIS W/ RFLX MICROSCOPIC    Collection Time: 05/28/21  5:46 PM   Result Value Ref Range    Color DARK YELLOW      Appearance CLOUDY      Specific gravity 1.027 1.005 - 1.030      pH (UA) >8.5 (H) 5.0 - 8.0    Protein 100 (A) NEG mg/dL    Glucose Negative NEG mg/dL    Ketone 15 (A) NEG mg/dL    Bilirubin Negative NEG      Blood Negative NEG      Urobilinogen 1.0 0.2 - 1.0 EU/dL    Nitrites Negative NEG      Leukocyte Esterase LARGE (A) NEG     URINE MICROSCOPIC ONLY    Collection Time: 05/28/21  5:46 PM   Result Value Ref Range    WBC 36 to 50 0 - 4 /hpf    RBC Negative 0 - 5 /hpf    Epithelial cells Negative 0 - 5 /lpf    Bacteria 1+ (A) NEG /hpf   CBC WITH AUTOMATED DIFF    Collection Time: 05/28/21  5:51 PM   Result Value Ref Range    WBC 9.6 4.6 - 13.2 K/uL    RBC 4.14 (L) 4.35 - 5.65 M/uL    HGB 12.0 (L) 13.0 - 16.0 g/dL    HCT 35.7 (L) 36.0 - 48.0 %    MCV 86.2 74.0 - 97.0 FL    MCH 29.0 24.0 - 34.0 PG    MCHC 33.6 31.0 - 37.0 g/dL    RDW 12.5 11.6 - 14.5 %    PLATELET 151 226 - 577 K/uL    MPV 9.6 9.2 - 11.8 FL    NEUTROPHILS 89 (H) 40 - 73 %    LYMPHOCYTES 5 (L) 21 - 52 %    MONOCYTES 5 3 - 10 %    EOSINOPHILS 1 0 - 5 %    BASOPHILS 0 0 - 2 %    ABS. NEUTROPHILS 8.5 (H) 1.8 - 8.0 K/UL    ABS. LYMPHOCYTES 0.5 (L) 0.9 - 3.6 K/UL    ABS. MONOCYTES 0.5 0.05 - 1.2 K/UL    ABS. EOSINOPHILS 0.1 0.0 - 0.4 K/UL    ABS. BASOPHILS 0.0 0.0 - 0.1 K/UL    DF AUTOMATED     METABOLIC PANEL, COMPREHENSIVE    Collection Time: 05/28/21  5:51 PM   Result Value Ref Range    Sodium 137 136 - 145 mmol/L    Potassium 3.6 3.5 - 5.5 mmol/L    Chloride 103 100 - 111 mmol/L    CO2 27 21 - 32 mmol/L    Anion gap 7 3.0 - 18 mmol/L    Glucose 105 (H) 74 - 99 mg/dL    BUN 13 7.0 - 18 MG/DL    Creatinine 0.86 0.6 - 1.3 MG/DL    BUN/Creatinine ratio 15 12 - 20      GFR est AA >60 >60 ml/min/1.73m2    GFR est non-AA >60 >60 ml/min/1.73m2    Calcium 9.2 8.5 - 10.1 MG/DL    Bilirubin, total 0.7 0.2 - 1.0 MG/DL    ALT (SGPT) 37 16 - 61 U/L    AST (SGOT) 29 10 - 38 U/L    Alk.  phosphatase 78 45 - 117 U/L    Protein, total 7.9 6.4 - 8.2 g/dL    Albumin 4.1 3.4 - 5.0 g/dL    Globulin 3.8 2.0 - 4.0 g/dL    A-G Ratio 1.1 0.8 - 1.7     LIPASE Collection Time: 05/28/21  5:51 PM   Result Value Ref Range    Lipase 55 (L) 73 - 393 U/L      Patient reports having a rash to his penis as well as discharge for the past 3 days. Given Rocephin here and Rx for doxy. This does not appear to be herpes, it looks more like dermatitis. RPR was ordered. Patient to apply hydrocortisone to the region. He was given strict instructions to follow-up with the health department without fail regarding this. He is also having bilateral abdominal pain in the lower quadrants, he is nontender to abdominal exam.  His vitals look well, white count is within normal limits. Doubt any appendicitis or other acute process. Patient was given strict instructions to return for any acute worsening should he develop a fever, worsening vomiting, or any other concerning symptoms. Patient did not vomit while in the ED, he stated that he was no longer nauseous, but still wanted to receive the Zofran. I offered to give him IV hydration here, he opted to drink water at home. Diagnosis:   1. Penile rash    2. Non-intractable vomiting with nausea, unspecified vomiting type      Disposition: Discharge    Follow-up Information     Follow up With Specialties Details Why [address] [facility]  In 3 days  [address], [address] [address]  [phone]    [facility] EMERGENCY DEPT Emergency Medicine  If symptoms worsen [address]  [phone]          Discharge Medication List as of 5/28/2021  7:09 PM      START taking these medications    Details   ondansetron (Zofran ODT) 4 mg disintegrating tablet Take 1-2 tablets every 6-8 hours as needed for nausea and vomiting., Normal, Disp-10 Tablet, R-0      hydrocortisone (ALA-TAJ) 1 % lotion Apply  to affected area two (2) times a day.  use thin layer to rash, Normal, Disp-96 g, R-0           JENY Palmer

## 2021-06-03 LAB
C TRACH RRNA SPEC QL NAA+PROBE: NEGATIVE
N GONORRHOEA RRNA SPEC QL NAA+PROBE: POSITIVE
SPECIMEN SOURCE: ABNORMAL
T VAGINALIS RRNA VAG QL NAA+PROBE: NEGATIVE

## 2021-06-19 NOTE — PROGRESS NOTES
Patient's phone number is not in service. Treated empirically for STDs prior to discharge. Abnormal results letter sent.

## 2022-03-19 PROBLEM — R45.851 SUICIDAL THOUGHTS: Status: ACTIVE | Noted: 2018-08-27

## 2022-03-20 PROBLEM — R45.89 SUICIDAL BEHAVIOR: Status: ACTIVE | Noted: 2018-08-27

## 2022-03-20 PROBLEM — F32.A DEPRESSION: Status: ACTIVE | Noted: 2018-08-27

## 2023-01-31 RX ORDER — ONDANSETRON 4 MG/1
TABLET, ORALLY DISINTEGRATING ORAL
COMMUNITY
Start: 2021-05-28

## 2023-01-31 RX ORDER — FENOPROFEN CALCIUM 200 MG
CAPSULE ORAL 2 TIMES DAILY
COMMUNITY
Start: 2021-05-28